# Patient Record
Sex: FEMALE | Race: WHITE | NOT HISPANIC OR LATINO | URBAN - METROPOLITAN AREA
[De-identification: names, ages, dates, MRNs, and addresses within clinical notes are randomized per-mention and may not be internally consistent; named-entity substitution may affect disease eponyms.]

---

## 2023-10-10 ENCOUNTER — EMERGENCY (EMERGENCY)
Facility: HOSPITAL | Age: 38
LOS: 1 days | Discharge: HOME | End: 2023-10-10
Attending: EMERGENCY MEDICINE | Admitting: EMERGENCY MEDICINE
Payer: COMMERCIAL

## 2023-10-10 VITALS
SYSTOLIC BLOOD PRESSURE: 139 MMHG | HEART RATE: 96 BPM | TEMPERATURE: 97 F | WEIGHT: 250 LBS | DIASTOLIC BLOOD PRESSURE: 96 MMHG | RESPIRATION RATE: 19 BRPM | OXYGEN SATURATION: 99 %

## 2023-10-10 VITALS
OXYGEN SATURATION: 97 % | SYSTOLIC BLOOD PRESSURE: 136 MMHG | RESPIRATION RATE: 17 BRPM | TEMPERATURE: 98 F | DIASTOLIC BLOOD PRESSURE: 87 MMHG | HEART RATE: 84 BPM

## 2023-10-10 DIAGNOSIS — Z87.39 PERSONAL HISTORY OF OTHER DISEASES OF THE MUSCULOSKELETAL SYSTEM AND CONNECTIVE TISSUE: Chronic | ICD-10-CM

## 2023-10-10 LAB
ALBUMIN SERPL ELPH-MCNC: 3.5 G/DL — SIGNIFICANT CHANGE UP (ref 3.3–5)
ALP SERPL-CCNC: 95 U/L — SIGNIFICANT CHANGE UP (ref 40–120)
ALT FLD-CCNC: 38 U/L — SIGNIFICANT CHANGE UP (ref 12–78)
ANION GAP SERPL CALC-SCNC: 6 MMOL/L — SIGNIFICANT CHANGE UP (ref 5–17)
APPEARANCE UR: CLEAR — SIGNIFICANT CHANGE UP
AST SERPL-CCNC: 33 U/L — SIGNIFICANT CHANGE UP (ref 15–37)
BACTERIA # UR AUTO: ABNORMAL /HPF
BASOPHILS # BLD AUTO: 0.06 K/UL — SIGNIFICANT CHANGE UP (ref 0–0.2)
BASOPHILS NFR BLD AUTO: 0.4 % — SIGNIFICANT CHANGE UP (ref 0–2)
BILIRUB SERPL-MCNC: 0.5 MG/DL — SIGNIFICANT CHANGE UP (ref 0.2–1.2)
BILIRUB UR-MCNC: NEGATIVE — SIGNIFICANT CHANGE UP
BUN SERPL-MCNC: 7 MG/DL — SIGNIFICANT CHANGE UP (ref 7–23)
CALCIUM SERPL-MCNC: 9.6 MG/DL — SIGNIFICANT CHANGE UP (ref 8.5–10.1)
CHLORIDE SERPL-SCNC: 109 MMOL/L — HIGH (ref 96–108)
CO2 SERPL-SCNC: 26 MMOL/L — SIGNIFICANT CHANGE UP (ref 22–31)
COLOR SPEC: YELLOW — SIGNIFICANT CHANGE UP
CREAT SERPL-MCNC: 0.47 MG/DL — LOW (ref 0.5–1.3)
DIFF PNL FLD: ABNORMAL
EGFR: 126 ML/MIN/1.73M2 — SIGNIFICANT CHANGE UP
EOSINOPHIL # BLD AUTO: 0.15 K/UL — SIGNIFICANT CHANGE UP (ref 0–0.5)
EOSINOPHIL NFR BLD AUTO: 1 % — SIGNIFICANT CHANGE UP (ref 0–6)
EPI CELLS # UR: PRESENT
GLUCOSE SERPL-MCNC: 101 MG/DL — HIGH (ref 70–99)
GLUCOSE UR QL: NEGATIVE MG/DL — SIGNIFICANT CHANGE UP
HCG UR QL: NEGATIVE — SIGNIFICANT CHANGE UP
HCT VFR BLD CALC: 41.1 % — SIGNIFICANT CHANGE UP (ref 34.5–45)
HGB BLD-MCNC: 12.9 G/DL — SIGNIFICANT CHANGE UP (ref 11.5–15.5)
IMM GRANULOCYTES NFR BLD AUTO: 0.5 % — SIGNIFICANT CHANGE UP (ref 0–0.9)
KETONES UR-MCNC: NEGATIVE MG/DL — SIGNIFICANT CHANGE UP
LEUKOCYTE ESTERASE UR-ACNC: ABNORMAL
LIDOCAIN IGE QN: 14 U/L — SIGNIFICANT CHANGE UP (ref 13–75)
LYMPHOCYTES # BLD AUTO: 15 % — SIGNIFICANT CHANGE UP (ref 13–44)
LYMPHOCYTES # BLD AUTO: 2.26 K/UL — SIGNIFICANT CHANGE UP (ref 1–3.3)
MCHC RBC-ENTMCNC: 28.2 PG — SIGNIFICANT CHANGE UP (ref 27–34)
MCHC RBC-ENTMCNC: 31.4 GM/DL — LOW (ref 32–36)
MCV RBC AUTO: 89.9 FL — SIGNIFICANT CHANGE UP (ref 80–100)
MONOCYTES # BLD AUTO: 0.66 K/UL — SIGNIFICANT CHANGE UP (ref 0–0.9)
MONOCYTES NFR BLD AUTO: 4.4 % — SIGNIFICANT CHANGE UP (ref 2–14)
NEUTROPHILS # BLD AUTO: 11.88 K/UL — HIGH (ref 1.8–7.4)
NEUTROPHILS NFR BLD AUTO: 78.7 % — HIGH (ref 43–77)
NITRITE UR-MCNC: NEGATIVE — SIGNIFICANT CHANGE UP
NRBC # BLD: 0 /100 WBCS — SIGNIFICANT CHANGE UP (ref 0–0)
PH UR: 7.5 — SIGNIFICANT CHANGE UP (ref 5–8)
PLATELET # BLD AUTO: 360 K/UL — SIGNIFICANT CHANGE UP (ref 150–400)
POTASSIUM SERPL-MCNC: 4.5 MMOL/L — SIGNIFICANT CHANGE UP (ref 3.5–5.3)
POTASSIUM SERPL-SCNC: 4.5 MMOL/L — SIGNIFICANT CHANGE UP (ref 3.5–5.3)
PROT SERPL-MCNC: 7.9 G/DL — SIGNIFICANT CHANGE UP (ref 6–8.3)
PROT UR-MCNC: NEGATIVE MG/DL — SIGNIFICANT CHANGE UP
RBC # BLD: 4.57 M/UL — SIGNIFICANT CHANGE UP (ref 3.8–5.2)
RBC # FLD: 12.5 % — SIGNIFICANT CHANGE UP (ref 10.3–14.5)
RBC CASTS # UR COMP ASSIST: 8 /HPF — HIGH (ref 0–4)
SODIUM SERPL-SCNC: 141 MMOL/L — SIGNIFICANT CHANGE UP (ref 135–145)
SP GR SPEC: 1.03 — HIGH (ref 1–1.03)
UROBILINOGEN FLD QL: 1 MG/DL — SIGNIFICANT CHANGE UP (ref 0.2–1)
WBC # BLD: 15.09 K/UL — HIGH (ref 3.8–10.5)
WBC # FLD AUTO: 15.09 K/UL — HIGH (ref 3.8–10.5)
WBC UR QL: 6 /HPF — HIGH (ref 0–5)

## 2023-10-10 PROCEDURE — G1004: CPT

## 2023-10-10 PROCEDURE — 81025 URINE PREGNANCY TEST: CPT

## 2023-10-10 PROCEDURE — 76856 US EXAM PELVIC COMPLETE: CPT

## 2023-10-10 PROCEDURE — 74177 CT ABD & PELVIS W/CONTRAST: CPT | Mod: ME

## 2023-10-10 PROCEDURE — 96374 THER/PROPH/DIAG INJ IV PUSH: CPT | Mod: XU

## 2023-10-10 PROCEDURE — 74177 CT ABD & PELVIS W/CONTRAST: CPT | Mod: 26,ME

## 2023-10-10 PROCEDURE — 99284 EMERGENCY DEPT VISIT MOD MDM: CPT | Mod: 25

## 2023-10-10 PROCEDURE — 36415 COLL VENOUS BLD VENIPUNCTURE: CPT

## 2023-10-10 PROCEDURE — 83690 ASSAY OF LIPASE: CPT

## 2023-10-10 PROCEDURE — 81001 URINALYSIS AUTO W/SCOPE: CPT

## 2023-10-10 PROCEDURE — 85025 COMPLETE CBC W/AUTO DIFF WBC: CPT

## 2023-10-10 PROCEDURE — 99285 EMERGENCY DEPT VISIT HI MDM: CPT

## 2023-10-10 PROCEDURE — 80053 COMPREHEN METABOLIC PANEL: CPT

## 2023-10-10 PROCEDURE — 76856 US EXAM PELVIC COMPLETE: CPT | Mod: 26

## 2023-10-10 RX ORDER — SODIUM CHLORIDE 9 MG/ML
1000 INJECTION INTRAMUSCULAR; INTRAVENOUS; SUBCUTANEOUS ONCE
Refills: 0 | Status: COMPLETED | OUTPATIENT
Start: 2023-10-10 | End: 2023-10-10

## 2023-10-10 RX ORDER — FAMOTIDINE 10 MG/ML
20 INJECTION INTRAVENOUS ONCE
Refills: 0 | Status: COMPLETED | OUTPATIENT
Start: 2023-10-10 | End: 2023-10-10

## 2023-10-10 RX ORDER — SUCRALFATE 1 G
1 TABLET ORAL ONCE
Refills: 0 | Status: COMPLETED | OUTPATIENT
Start: 2023-10-10 | End: 2023-10-10

## 2023-10-10 RX ADMIN — FAMOTIDINE 20 MILLIGRAM(S): 10 INJECTION INTRAVENOUS at 16:24

## 2023-10-10 RX ADMIN — Medication 1 GRAM(S): at 16:24

## 2023-10-10 RX ADMIN — SODIUM CHLORIDE 1000 MILLILITER(S): 9 INJECTION INTRAMUSCULAR; INTRAVENOUS; SUBCUTANEOUS at 16:25

## 2023-10-10 NOTE — ED PROVIDER NOTE - CONSTITUTIONAL, MLM
Well appearing, awake, alert, oriented to person, place, time/situation and in no apparent distress. no normal...

## 2023-10-10 NOTE — ED ADULT NURSE NOTE - OBJECTIVE STATEMENT
Pt. received alert and oriented x3 with chief complaint of upper bilateral abdominal pain since this afternoon. Pt. denies any other symptoms at current time.

## 2023-10-10 NOTE — ED PROVIDER NOTE - CARE PROVIDER_API CALL
John Devine  Gastroenterology  [address] [place] [address]  Phone: [phone]  Fax: [phone]  Follow Up Time:    Carolina Osman  Obstetrics and Gynecology  [address], [address]  [place] [address]  Phone: [phone]  Fax: [phone]  Follow Up Time:

## 2023-10-10 NOTE — ED PROVIDER NOTE - NSICDXPASTMEDICALHX_GEN_ALL_CORE_FT
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

## 2023-10-10 NOTE — ED PROVIDER NOTE - NSICDXFAMILYHX_GEN_ALL_CORE_FT
FAMILY HISTORY:  Father  Still living? Unknown  Family history of early CAD, Age at diagnosis: Age Unknown  Family history of hypertension, Age at diagnosis: Age Unknown    Mother  Still living? Unknown  Family history of early CAD, Age at diagnosis: Age Unknown  Family history of ovarian cancer, Age at diagnosis: Age Unknown  Family history of pancreatic cancer, Age at diagnosis: Age Unknown    Aunt  Still living? Unknown  Family history of diabetes mellitus (DM), Age at diagnosis: Age Unknown

## 2023-10-10 NOTE — ED PROVIDER NOTE - OBJECTIVE STATEMENT
39yo female with sudden onset of abd pain today, pt states he started to have pain today at 130pm, sudden in onset, diffuse, no nausea or vomiting no fever, chills, pt took peptobismol and gas x with no relief.

## 2023-10-10 NOTE — ED PROVIDER NOTE - PROGRESS NOTE DETAILS
patient states that her abdominal pain has improved 1/10, ct and US results discussed, patient states she has a history of ovarian cysts in 2016, non toxic appearing, agrees to f/u with her GYN, understands to return to er for worsnieng of symoptms, fever

## 2023-10-10 NOTE — ED PROVIDER NOTE - NSFOLLOWUPINSTRUCTIONS_ED_ALL_ED_FT
Follow up with your GYN this week, call to make an appointment. Take over the counter ibuprofen 600mg orally every 4 -6 hours as needed for pain. Return to ER for fever >100.3 F, severe pain and or worsneing of symptoms.

## 2023-10-10 NOTE — ED PROVIDER NOTE - PATIENT PORTAL LINK FT
You can access the FollowMyHealth Patient Portal offered by [facility] by registering at the following website: [email or web address]. By joining [email or web address]’s FollowMyHealth portal, you will also be able to view your health information using other applications (apps) compatible with our system.

## 2023-10-10 NOTE — ED PROVIDER NOTE - DISCHARGE DATE
to call for any fever or for prolonged or severe pain or bleeding. She was advised to use OTC analgesics as needed for mild to moderate pain. She was advised to avoid vaginal intercourse for 48 hours or until the bleeding has completely stopped. Attending Physician Documentation:  I was present for or participated in the entire procedure, including opening and closing.     -plans per results. Will need to decide what to do if cycles continue to be heavy and painful. 10-Oct-2023

## 2023-10-10 NOTE — ED ADULT NURSE NOTE - NSFALLHARMRISKINTERV_ED_ALL_ED

## 2023-11-02 ENCOUNTER — NON-APPOINTMENT (OUTPATIENT)
Age: 38
End: 2023-11-02

## 2023-11-03 ENCOUNTER — NON-APPOINTMENT (OUTPATIENT)
Age: 38
End: 2023-11-03

## 2023-11-06 ENCOUNTER — NON-APPOINTMENT (OUTPATIENT)
Age: 38
End: 2023-11-06

## 2023-11-07 ENCOUNTER — APPOINTMENT (OUTPATIENT)
Dept: GYNECOLOGIC ONCOLOGY | Facility: CLINIC | Age: 38
End: 2023-11-07
Payer: COMMERCIAL

## 2023-11-07 ENCOUNTER — NON-APPOINTMENT (OUTPATIENT)
Age: 38
End: 2023-11-07

## 2023-11-07 VITALS
HEIGHT: 63 IN | DIASTOLIC BLOOD PRESSURE: 104 MMHG | BODY MASS INDEX: 39.87 KG/M2 | WEIGHT: 225 LBS | HEART RATE: 97 BPM | SYSTOLIC BLOOD PRESSURE: 162 MMHG

## 2023-11-07 DIAGNOSIS — Z80.1 FAMILY HISTORY OF MALIGNANT NEOPLASM OF TRACHEA, BRONCHUS AND LUNG: ICD-10-CM

## 2023-11-07 DIAGNOSIS — Z80.41 FAMILY HISTORY OF MALIGNANT NEOPLASM OF OVARY: ICD-10-CM

## 2023-11-07 DIAGNOSIS — Z80.0 FAMILY HISTORY OF MALIGNANT NEOPLASM OF DIGESTIVE ORGANS: ICD-10-CM

## 2023-11-07 LAB
APTT BLD: 32.3 SEC
INR PPP: 0.94 RATIO
PT BLD: 10.7 SEC

## 2023-11-07 PROCEDURE — 99205 OFFICE O/P NEW HI 60 MIN: CPT

## 2023-11-07 RX ORDER — CETIRIZINE HCL 10 MG
TABLET ORAL
Refills: 0 | Status: ACTIVE | COMMUNITY

## 2023-11-08 LAB
ALBUMIN SERPL ELPH-MCNC: 4.6 G/DL
ALP BLD-CCNC: 92 U/L
ALT SERPL-CCNC: 28 U/L
ANION GAP SERPL CALC-SCNC: 15 MMOL/L
AST SERPL-CCNC: 24 U/L
BASOPHILS # BLD AUTO: 0.05 K/UL
BASOPHILS NFR BLD AUTO: 0.5 %
BILIRUB SERPL-MCNC: 0.4 MG/DL
BUN SERPL-MCNC: 6 MG/DL
CALCIUM SERPL-MCNC: 9.7 MG/DL
CANCER AG125 SERPL-ACNC: 139 U/ML
CANCER AG19-9 SERPL-ACNC: 45 U/ML
CEA SERPL-MCNC: 1.2 NG/ML
CHLORIDE SERPL-SCNC: 99 MMOL/L
CO2 SERPL-SCNC: 23 MMOL/L
CREAT SERPL-MCNC: 0.3 MG/DL
EGFR: 140 ML/MIN/1.73M2
EOSINOPHIL # BLD AUTO: 0.18 K/UL
EOSINOPHIL NFR BLD AUTO: 1.9 %
ESTIMATED AVERAGE GLUCOSE: 97 MG/DL
GLUCOSE SERPL-MCNC: 99 MG/DL
HBA1C MFR BLD HPLC: 5 %
HCT VFR BLD CALC: 41.9 %
HGB BLD-MCNC: 12.7 G/DL
IMM GRANULOCYTES NFR BLD AUTO: 0.4 %
LYMPHOCYTES # BLD AUTO: 2.96 K/UL
LYMPHOCYTES NFR BLD AUTO: 30.8 %
MAN DIFF?: NORMAL
MCHC RBC-ENTMCNC: 27.9 PG
MCHC RBC-ENTMCNC: 30.3 GM/DL
MCV RBC AUTO: 92.1 FL
MONOCYTES # BLD AUTO: 0.59 K/UL
MONOCYTES NFR BLD AUTO: 6.1 %
NEUTROPHILS # BLD AUTO: 5.8 K/UL
NEUTROPHILS NFR BLD AUTO: 60.3 %
PLATELET # BLD AUTO: 350 K/UL
POTASSIUM SERPL-SCNC: 4.7 MMOL/L
PROT SERPL-MCNC: 7.4 G/DL
RBC # BLD: 4.55 M/UL
RBC # FLD: 13.1 %
SODIUM SERPL-SCNC: 137 MMOL/L
WBC # FLD AUTO: 9.62 K/UL

## 2023-11-09 ENCOUNTER — APPOINTMENT (OUTPATIENT)
Dept: SURGERY | Facility: CLINIC | Age: 38
End: 2023-11-09
Payer: COMMERCIAL

## 2023-11-09 VITALS
OXYGEN SATURATION: 100 % | WEIGHT: 225 LBS | HEIGHT: 63 IN | RESPIRATION RATE: 15 BRPM | BODY MASS INDEX: 39.87 KG/M2 | SYSTOLIC BLOOD PRESSURE: 142 MMHG | HEART RATE: 92 BPM | DIASTOLIC BLOOD PRESSURE: 84 MMHG

## 2023-11-09 DIAGNOSIS — Z83.79 FAMILY HISTORY OF OTHER DISEASES OF THE DIGESTIVE SYSTEM: ICD-10-CM

## 2023-11-09 PROCEDURE — 99202 OFFICE O/P NEW SF 15 MIN: CPT

## 2023-11-09 RX ORDER — METRONIDAZOLE 250 MG/1
250 TABLET ORAL
Qty: 3 | Refills: 0 | Status: ACTIVE | COMMUNITY
Start: 2023-11-09 | End: 1900-01-01

## 2023-11-09 RX ORDER — NEOMYCIN SULFATE 500 MG/1
500 TABLET ORAL
Qty: 3 | Refills: 0 | Status: ACTIVE | COMMUNITY
Start: 2023-11-09 | End: 1900-01-01

## 2023-11-13 ENCOUNTER — APPOINTMENT (OUTPATIENT)
Dept: GASTROENTEROLOGY | Facility: CLINIC | Age: 38
End: 2023-11-13
Payer: COMMERCIAL

## 2023-11-13 VITALS
HEART RATE: 94 BPM | BODY MASS INDEX: 39.87 KG/M2 | OXYGEN SATURATION: 97 % | SYSTOLIC BLOOD PRESSURE: 137 MMHG | WEIGHT: 225 LBS | RESPIRATION RATE: 20 BRPM | DIASTOLIC BLOOD PRESSURE: 84 MMHG | HEIGHT: 63 IN

## 2023-11-13 DIAGNOSIS — G71.00 MUSCULAR DYSTROPHY, UNSPECIFIED: ICD-10-CM

## 2023-11-13 DIAGNOSIS — K59.09 OTHER CONSTIPATION: ICD-10-CM

## 2023-11-13 DIAGNOSIS — Z87.39 PERSONAL HISTORY OF OTHER DISEASES OF THE MUSCULOSKELETAL SYSTEM AND CONNECTIVE TISSUE: ICD-10-CM

## 2023-11-13 DIAGNOSIS — R10.9 UNSPECIFIED ABDOMINAL PAIN: ICD-10-CM

## 2023-11-13 DIAGNOSIS — K62.5 HEMORRHAGE OF ANUS AND RECTUM: ICD-10-CM

## 2023-11-13 DIAGNOSIS — K57.30 DIVERTICULOSIS OF LARGE INTESTINE W/OUT PERFORATION OR ABSCESS W/OUT BLEEDING: ICD-10-CM

## 2023-11-13 DIAGNOSIS — K64.9 UNSPECIFIED HEMORRHOIDS: ICD-10-CM

## 2023-11-13 PROCEDURE — 99204 OFFICE O/P NEW MOD 45 MIN: CPT

## 2023-11-14 ENCOUNTER — APPOINTMENT (OUTPATIENT)
Dept: MRI IMAGING | Facility: CLINIC | Age: 38
End: 2023-11-14
Payer: COMMERCIAL

## 2023-11-14 PROCEDURE — 72197 MRI PELVIS W/O & W/DYE: CPT

## 2023-11-14 PROCEDURE — 74183 MRI ABD W/O CNTR FLWD CNTR: CPT

## 2023-11-17 ENCOUNTER — NON-APPOINTMENT (OUTPATIENT)
Age: 38
End: 2023-11-17

## 2023-11-27 PROBLEM — N80.9 ENDOMETRIOSIS: Status: ACTIVE | Noted: 2023-11-27

## 2023-11-27 PROBLEM — R19.00 PELVIC MASS IN FEMALE: Status: ACTIVE | Noted: 2023-11-05

## 2023-11-28 ENCOUNTER — APPOINTMENT (OUTPATIENT)
Dept: GYNECOLOGIC ONCOLOGY | Facility: CLINIC | Age: 38
End: 2023-11-28
Payer: COMMERCIAL

## 2023-11-28 VITALS
HEIGHT: 63 IN | WEIGHT: 225 LBS | BODY MASS INDEX: 39.87 KG/M2 | HEART RATE: 80 BPM | SYSTOLIC BLOOD PRESSURE: 152 MMHG | DIASTOLIC BLOOD PRESSURE: 93 MMHG

## 2023-11-28 DIAGNOSIS — N80.9 ENDOMETRIOSIS, UNSPECIFIED: ICD-10-CM

## 2023-11-28 DIAGNOSIS — R19.00 INTRA-ABDOMINAL AND PELVIC SWELLING, MASS AND LUMP, UNSPECIFIED SITE: ICD-10-CM

## 2023-11-28 PROCEDURE — 99215 OFFICE O/P EST HI 40 MIN: CPT

## 2023-12-21 ENCOUNTER — OUTPATIENT (OUTPATIENT)
Dept: OUTPATIENT SERVICES | Facility: HOSPITAL | Age: 38
LOS: 1 days | End: 2023-12-21

## 2023-12-21 VITALS
SYSTOLIC BLOOD PRESSURE: 148 MMHG | WEIGHT: 242.07 LBS | TEMPERATURE: 98 F | HEIGHT: 62.5 IN | DIASTOLIC BLOOD PRESSURE: 90 MMHG | RESPIRATION RATE: 16 BRPM | OXYGEN SATURATION: 97 % | HEART RATE: 87 BPM

## 2023-12-21 DIAGNOSIS — Z90.49 ACQUIRED ABSENCE OF OTHER SPECIFIED PARTS OF DIGESTIVE TRACT: Chronic | ICD-10-CM

## 2023-12-21 DIAGNOSIS — R19.00 INTRA-ABDOMINAL AND PELVIC SWELLING, MASS AND LUMP, UNSPECIFIED SITE: ICD-10-CM

## 2023-12-21 DIAGNOSIS — Z87.39 PERSONAL HISTORY OF OTHER DISEASES OF THE MUSCULOSKELETAL SYSTEM AND CONNECTIVE TISSUE: Chronic | ICD-10-CM

## 2023-12-21 LAB
A1C WITH ESTIMATED AVERAGE GLUCOSE RESULT: 4.9 % — SIGNIFICANT CHANGE UP (ref 4–5.6)
A1C WITH ESTIMATED AVERAGE GLUCOSE RESULT: 4.9 % — SIGNIFICANT CHANGE UP (ref 4–5.6)
ANION GAP SERPL CALC-SCNC: 15 MMOL/L — HIGH (ref 7–14)
ANION GAP SERPL CALC-SCNC: 15 MMOL/L — HIGH (ref 7–14)
BLD GP AB SCN SERPL QL: NEGATIVE — SIGNIFICANT CHANGE UP
BLD GP AB SCN SERPL QL: NEGATIVE — SIGNIFICANT CHANGE UP
BUN SERPL-MCNC: 6 MG/DL — LOW (ref 7–23)
BUN SERPL-MCNC: 6 MG/DL — LOW (ref 7–23)
CALCIUM SERPL-MCNC: 9.8 MG/DL — SIGNIFICANT CHANGE UP (ref 8.4–10.5)
CALCIUM SERPL-MCNC: 9.8 MG/DL — SIGNIFICANT CHANGE UP (ref 8.4–10.5)
CHLORIDE SERPL-SCNC: 102 MMOL/L — SIGNIFICANT CHANGE UP (ref 98–107)
CHLORIDE SERPL-SCNC: 102 MMOL/L — SIGNIFICANT CHANGE UP (ref 98–107)
CO2 SERPL-SCNC: 21 MMOL/L — LOW (ref 22–31)
CO2 SERPL-SCNC: 21 MMOL/L — LOW (ref 22–31)
CREAT SERPL-MCNC: 0.28 MG/DL — LOW (ref 0.5–1.3)
CREAT SERPL-MCNC: 0.28 MG/DL — LOW (ref 0.5–1.3)
EGFR: 142 ML/MIN/1.73M2 — SIGNIFICANT CHANGE UP
EGFR: 142 ML/MIN/1.73M2 — SIGNIFICANT CHANGE UP
ESTIMATED AVERAGE GLUCOSE: 94 — SIGNIFICANT CHANGE UP
ESTIMATED AVERAGE GLUCOSE: 94 — SIGNIFICANT CHANGE UP
GLUCOSE SERPL-MCNC: 85 MG/DL — SIGNIFICANT CHANGE UP (ref 70–99)
GLUCOSE SERPL-MCNC: 85 MG/DL — SIGNIFICANT CHANGE UP (ref 70–99)
HCG SERPL-ACNC: <1 MIU/ML — SIGNIFICANT CHANGE UP
HCG SERPL-ACNC: <1 MIU/ML — SIGNIFICANT CHANGE UP
HCT VFR BLD CALC: 39.7 % — SIGNIFICANT CHANGE UP (ref 34.5–45)
HCT VFR BLD CALC: 39.7 % — SIGNIFICANT CHANGE UP (ref 34.5–45)
HGB BLD-MCNC: 12.5 G/DL — SIGNIFICANT CHANGE UP (ref 11.5–15.5)
HGB BLD-MCNC: 12.5 G/DL — SIGNIFICANT CHANGE UP (ref 11.5–15.5)
MCHC RBC-ENTMCNC: 27.7 PG — SIGNIFICANT CHANGE UP (ref 27–34)
MCHC RBC-ENTMCNC: 27.7 PG — SIGNIFICANT CHANGE UP (ref 27–34)
MCHC RBC-ENTMCNC: 31.5 GM/DL — LOW (ref 32–36)
MCHC RBC-ENTMCNC: 31.5 GM/DL — LOW (ref 32–36)
MCV RBC AUTO: 88 FL — SIGNIFICANT CHANGE UP (ref 80–100)
MCV RBC AUTO: 88 FL — SIGNIFICANT CHANGE UP (ref 80–100)
NRBC # BLD: 0 /100 WBCS — SIGNIFICANT CHANGE UP (ref 0–0)
NRBC # BLD: 0 /100 WBCS — SIGNIFICANT CHANGE UP (ref 0–0)
NRBC # FLD: 0 K/UL — SIGNIFICANT CHANGE UP (ref 0–0)
NRBC # FLD: 0 K/UL — SIGNIFICANT CHANGE UP (ref 0–0)
PLATELET # BLD AUTO: 398 K/UL — SIGNIFICANT CHANGE UP (ref 150–400)
PLATELET # BLD AUTO: 398 K/UL — SIGNIFICANT CHANGE UP (ref 150–400)
POTASSIUM SERPL-MCNC: 3.9 MMOL/L — SIGNIFICANT CHANGE UP (ref 3.5–5.3)
POTASSIUM SERPL-MCNC: 3.9 MMOL/L — SIGNIFICANT CHANGE UP (ref 3.5–5.3)
POTASSIUM SERPL-SCNC: 3.9 MMOL/L — SIGNIFICANT CHANGE UP (ref 3.5–5.3)
POTASSIUM SERPL-SCNC: 3.9 MMOL/L — SIGNIFICANT CHANGE UP (ref 3.5–5.3)
RBC # BLD: 4.51 M/UL — SIGNIFICANT CHANGE UP (ref 3.8–5.2)
RBC # BLD: 4.51 M/UL — SIGNIFICANT CHANGE UP (ref 3.8–5.2)
RBC # FLD: 12.7 % — SIGNIFICANT CHANGE UP (ref 10.3–14.5)
RBC # FLD: 12.7 % — SIGNIFICANT CHANGE UP (ref 10.3–14.5)
RH IG SCN BLD-IMP: POSITIVE — SIGNIFICANT CHANGE UP
RH IG SCN BLD-IMP: POSITIVE — SIGNIFICANT CHANGE UP
SODIUM SERPL-SCNC: 138 MMOL/L — SIGNIFICANT CHANGE UP (ref 135–145)
SODIUM SERPL-SCNC: 138 MMOL/L — SIGNIFICANT CHANGE UP (ref 135–145)
WBC # BLD: 11.22 K/UL — HIGH (ref 3.8–10.5)
WBC # BLD: 11.22 K/UL — HIGH (ref 3.8–10.5)
WBC # FLD AUTO: 11.22 K/UL — HIGH (ref 3.8–10.5)
WBC # FLD AUTO: 11.22 K/UL — HIGH (ref 3.8–10.5)

## 2023-12-21 RX ORDER — SODIUM CHLORIDE 9 MG/ML
3 INJECTION INTRAMUSCULAR; INTRAVENOUS; SUBCUTANEOUS EVERY 8 HOURS
Refills: 0 | Status: DISCONTINUED | OUTPATIENT
Start: 2023-12-28 | End: 2023-12-29

## 2023-12-21 RX ORDER — SODIUM CHLORIDE 9 MG/ML
1000 INJECTION, SOLUTION INTRAVENOUS
Refills: 0 | Status: DISCONTINUED | OUTPATIENT
Start: 2023-12-28 | End: 2023-12-29

## 2023-12-21 RX ORDER — CHLORHEXIDINE GLUCONATE 213 G/1000ML
1 SOLUTION TOPICAL ONCE
Refills: 0 | Status: COMPLETED | OUTPATIENT
Start: 2023-12-28 | End: 2023-12-28

## 2023-12-21 NOTE — H&P PST ADULT - NEGATIVE GENERAL GENITOURINARY SYMPTOMS
no hematuria/no flank pain L/no flank pain R/no urine discoloration/no incontinence/no dysuria/normal urinary frequency/no nocturia

## 2023-12-21 NOTE — H&P PST ADULT - HISTORY OF PRESENT ILLNESS
37 y/o female with hereditary inclusion body myopathies (HIBM) presents to [facility] preop for robotic assisted total laparoscopic hysterectomy with bilateral salpingo oophorectomy possible open procedure. Pt presented to [facility] in October reporting abdominal pain. Workup imagining revealed a pelvic mass.      37 y/o female with hereditary inclusion body myopathies (HIBM) presents to [facility] preop for robotic assisted total laparoscopic hysterectomy with bilateral salpingo oophorectomy possible open procedure. Pt presented to [facility] in October reporting abdominal pain. Workup imagining revealed a pelvic mass.

## 2023-12-21 NOTE — H&P PST ADULT - OTHER CARE PROVIDERS
Neuromuscular Specialist Dr. Moreno Duarte [phone] Neuromuscular Specialist Dr. Moreno Duarte [phone]

## 2023-12-21 NOTE — H&P PST ADULT - NSICDXFAMILYHX_GEN_ALL_CORE_FT
FAMILY HISTORY:  Father  Still living? Unknown  Family history of early CAD, Age at diagnosis: Age Unknown  Family history of hypertension, Age at diagnosis: Age Unknown    Mother  Still living? Unknown  Family history of early CAD, Age at diagnosis: Age Unknown  Family history of ovarian cancer, Age at diagnosis: Age Unknown  Family history of pancreatic cancer, Age at diagnosis: Age Unknown    Aunt  Still living? Unknown  Family history of diabetes mellitus (DM), Age at diagnosis: Age Unknown  FH: ovarian cancer, Age at diagnosis: Age Unknown

## 2023-12-21 NOTE — H&P PST ADULT - ASSESSMENT
37 y/o female with hereditary inclusion body myopathies (HIBM) presents to [facility] preop for robotic assisted total laparoscopic hysterectomy with bilateral salpingo oophorectomy possible open procedure. Pt presented to [facility] in October reporting abdominal pain. Workup imagining revealed a pelvic mass.  39 y/o female with hereditary inclusion body myopathies (HIBM) presents to [facility] preop for robotic assisted total laparoscopic hysterectomy with bilateral salpingo oophorectomy possible open procedure. Pt presented to [facility] in October reporting abdominal pain. Workup imagining revealed a pelvic mass.

## 2023-12-21 NOTE — H&P PST ADULT - NEGATIVE NEUROLOGICAL SYMPTOMS
no paresthesias/no generalized seizures/no syncope/no tremors/no vertigo/no loss of sensation/no headache/no loss of consciousness/no hemiparesis/no confusion/no facial palsy

## 2023-12-21 NOTE — H&P PST ADULT - RESPIRATORY
clear to auscultation bilaterally/no wheezes/breath sounds equal clear to auscultation bilaterally/no wheezes/breath sounds equal/respirations non-labored

## 2023-12-21 NOTE — H&P PST ADULT - NECK
FROM/supple/symmetric/no tracheal deviation FROM/supple/symmetric/no tracheal deviation/no palpable masses

## 2023-12-21 NOTE — H&P PST ADULT - NSICDXPASTMEDICALHX_GEN_ALL_CORE_FT
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

## 2023-12-21 NOTE — H&P PST ADULT - CARDIOVASCULAR
regular rate and rhythm/S1 S2 present negative regular rate and rhythm/S1 S2 present/no murmur/no JVD/peripheral edema/vascular

## 2023-12-21 NOTE — H&P PST ADULT - ATTENDING COMMENTS
Patient seen in ASU with her mother, no changes reported. Consent form reviewed including examination by learner, total hysterectomy, bilateral salpingoophorectomy, possible; open procedure, staging, debulking, bowel surgery. All questions answered. Case reviewed with circulating OR team.     Rosaura Myers MD

## 2023-12-21 NOTE — H&P PST ADULT - PROBLEM SELECTOR PLAN 1
preop for robotic assisted total laparoscopic hysterectomy with bilateral salpingo oophorectomy possible open procedure on 12/28/23  preop instructions given, pt verbalized understanding  GI prophylaxis and chlorhexidine wash provided  cbc, bmp, hga1c, type, abo, hcg pending preop for robotic assisted total laparoscopic hysterectomy with bilateral salpingo oophorectomy possible open procedure on 12/28/23  preop instructions given, pt verbalized understanding  GI prophylaxis and chlorhexidine wash provided  cbc, bmp, hga1c, type, hcg pending  unable to obtain ABO at PST. will order confirmatory ABO for DOS.    Medical evaluation requested- pt's METS <4.

## 2023-12-21 NOTE — H&P PST ADULT - FUNCTIONAL STATUS
Activity: walks short distances with assistance of walker in home, uses wheelchair outside the home, can shower and dress self independently   Symptoms: None  Mets: 3.30 using [employer] calculator

## 2023-12-27 ENCOUNTER — TRANSCRIPTION ENCOUNTER (OUTPATIENT)
Age: 38
End: 2023-12-27

## 2023-12-27 NOTE — ASU PATIENT PROFILE, ADULT - FALL HARM RISK - UNIVERSAL INTERVENTIONS
Bed in lowest position, wheels locked, appropriate side rails in place/Call bell, personal items and telephone in reach/Instruct patient to call for assistance before getting out of bed or chair/Non-slip footwear when patient is out of bed/[place] to call system/Physically safe environment - no spills, clutter or unnecessary equipment/Purposeful Proactive Rounding/Room/bathroom lighting operational, light cord in reach Bed in lowest position, wheels locked, appropriate side rails in place/Call bell, personal items and telephone in reach/Instruct patient to call for assistance before getting out of bed or chair/Non-slip footwear when patient is out of bed/[place] to call system/Physically safe environment - no spills, clutter or unnecessary equipment/Purposeful Proactive Rounding/Room/bathroom lighting operational, light cord in reach

## 2023-12-27 NOTE — ASU PATIENT PROFILE, ADULT - ABILITY TO HEAR (WITH HEARING AID OR HEARING APPLIANCE IF NORMALLY USED):
Initial Anesthesia Post-op Note    Patient: Laurita Rock  Procedure(s) Performed:  SECTION  Anesthesia type: General    Vitals Value Taken Time   Temp 364 10/01/21 1602   Pulse 113 10/01/21 1602   Resp 24 10/01/21 1602   SpO2 100 10/01/21 1602   /73 10/01/21 1602         Patient Location: PACU Phase 1  Post-op Vital Signs:stable  Level of Consciousness: awake and alert  Respiratory Status: spontaneous ventilation  Cardiovascular stable  Hydration: euvolemic  Pain Management: adequately controlled  Handoff: Handoff to receiving nurse was performed and questions were answered  Vomiting: none  Nausea: None  Airway Patency:patent  Post-op Assessment: no complications, patient tolerated procedure well with no complications, no evidence of recall and dentition within defined limits      No complications documented.  
Adequate: hears normal conversation without difficulty

## 2023-12-27 NOTE — REVIEW OF SYSTEMS
[Negative] : Musculoskeletal [FreeTextEntry4] : occassional pelvic discomfort [FreeTextEntry2] : baseline neurologic dysfunction with LE weakness  [de-identified] : known baseline LE edema

## 2023-12-27 NOTE — ASSESSMENT
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

## 2023-12-27 NOTE — PHYSICAL EXAM
[Chaperone Present] : A chaperone was present in the examining room during all aspects of the physical examination [Normal] : No focal neurologic defects observed [Ambulatory and capable of all self care but unable to carry out any work activities] : Status 2- Ambulatory and capable of all self care but unable to carry out any work activities. Up and about more than 50% of waking hours [FreeTextEntry1] : [place]  [de-identified] : soft, obese, nontender

## 2023-12-27 NOTE — DISCUSSION/SUMMARY
[FreeTextEntry1] : - Patients history and work up to date reviewed in detail. - Supportive counseling provided given the patients ongoing medical issues and her family history of ovarian cancer. - We reviewed her consultaiton with Dr. Neely and her imaging results since her last visit in detail.  -Her genetic testing results returned (ordered by her prior GYN) revelaing BRIP1 mutation. This was extensively reviewed with the patient and her parents today. Her father noted this was the same mutation as his sister and niece who had ovarian cancer diagnosed in their 40's. We reviewed that with a BRIP1 pathogenic mutation, the absolute risk of ovarian cancer is reported to be between 5%-15% and NCCN guidelines recommend RRSO starting at age 45-50 years old. We reviewed the risks/benefits early menopause and future inability to have a biologic child with RRSO at the time of her currently procedure versus risk of malignancy given her family history, genetic predisposition and endometriosis. After extensive discussion, the patient desires to proceed with definitive surgery and risk reduction of cancer with hysterectomy and BSO. We discussed the risks/benefits of HRT after her surgery and she will think about it further but is inclined to have HRT.  - Therefore, after careful consideration and consultation the patient desires to undergo definitive surgical management with hysterectomy and BSO to be attempted via a minimally invasive approach if feasible. Dr. Neely will be available for backup given her complex surgical history (prior history of a ruptured appendix). - We discussed risks and benefits of surgery as well as the typical post operative course.  We also discussed potential alternatives to surgery. Ample time was allowed for questions to be asked and solicited.  All were answered and the patient expressed understanding.    We discussed the risks of surgery which include, but are not limited to:  -Bleeding that may require a blood transfusion  -Infection of the surgical incision sites, lungs, urine, kidneys or IV site that may can compromise healing and require IV antibiotics  -Injury to surrounding structures including the bladder, bowel, ureters, urethra, blood vessels, or nerves that may result in a loss of function or sensation.  -Blood clots in the extremities or lungs, which may lead to long term anticoagulation and difficulty breathing -The need for more surgery  After our discussion, all questions were again answered. She is aware of the risks and benefits and would like to proceed.  Patient will be seen by our anesthesia colleagues in pre-admission testing and have preoperative labs drawn.  -She will obtain perioperative optimization recommendations and medical clearances prior to the procedure.  - To have bowel prep for her surgery and patient aware. - I spent the time noted on the day of this patient encounter preparing for, providing and documenting the above service. I have counseled and educated the patient on the differential, workup, disease course, and treatment/management plan. Education was provided to the patient during this encounter. All questions and concerns were answered and addressed in detail.

## 2023-12-27 NOTE — HISTORY OF PRESENT ILLNESS
[FreeTextEntry1] : ED/ref: [facility] GYN: Dr. Clark  PCP:  Dr. Keith Leventhal Neuromuscular specialist ([facility]) - Dr. Moreno Duarte  Pt presents to today's visit with her mother.   Pt ambulates with a walker, presents today in a wheelchair Pt has b/l LE compression devices for b/l LE lymphadema.    Ms. Brar, 38 years old, virginal, referred through [facility] after presenting to [facility] with abdominal pain.  Work-up included imaging identifying a pelvic mass.  Of note, patient's medical history is significant for hereditary Inclusion Body Myopathies (HIBM). She ambulates with the assistance of a walker.   Denies f/c/n/v/d/changes to bowel or bladder habits.  Denies unintentional weight loss or gain.  Pt states she does have a history of constipation and 3 days after her visit to [place] ED, she had a bowel movement which resolved her abdominal pain.    Denies any other associated symptoms.    Interval history since [facility] visit of 2023: Consulted with colorectal, Dr. Neely with the plan for colorectal standby for surgery. Consulted with GI for bowel regimen perioperatively and postoperatively. Her genetic testing results returned (ordered by her prior GYN) revelaing BRIP1 mutation. This was extensively reviewed with the patient and her parents today. Her father noted this was the same mutation as his sister and niece who had ovarian cancer diagnosed in their 40's. We reviewed that with a BRIP1 pathogenic mutation, the absolute risk of ovarian cancer is reported to be between 5%-15% and NCCN guidelines recommend RRSO starting at age 45-50 years old. We reviewed the risks/benefits early menopause and future inability to have a biologic child with RRSO at the time of her currently procedure versus risk of malignancy given her family history, genetic predisposition and endometriosis. After extensive discussion, the patient desires to proceed with definitive surgery and risk reduction of cancer with hysterectomy and BSO. We discussed the risks/benefits of HRT after her surgery and she will think about it further but is inclined to have HRT.   Imaging: 10/10/2023 TVUS ([facility]) Uterus: 11.0 cm x 7.8 cm x 9.9 cm. . The uterine parenchyma is heterogeneous. Multiple fibroids are identified. Within the right posterior uterus there is a 4.6 x 4.2 x 3.4 cm intramural fibroid. A posterior subserosal fibroid measures 3.2 x 2.4 x 2.2 cm. A left intramural fibroid measures 4.1 x 3.2 x 2.6 cm. Endometrium: The endometrial echo complex cannot be visualized. Right ovary:There is a dilated fluid-filled tubular structure adjacent to the right ovary, suspicious for hydrosalpinx. The ovary measures 7.7 cm x 5.0 cm x 6.0 cm. . This measurement includes a 4.4 x 4.1 x 4.5 cm cystic structure with question of internal echoes, which is either a partially exophytic ovarian complex, or may represent portion of the dilated tube. Left ovary: A normal left ovary is not visualized. There is a large cystic structure of the left adnexa with associated septations. This measures 11.8 x 7.5 x 0.4 cm. Fluid: None visualized, though limited in evaluation secondary bowel gas. IMPRESSION: Fibroid uterus. Endometrium not visualized.  Question complex exophytic complex cyst of the right ovary. This may represent a hemorrhagic cyst or endometrioma. Alternatively, this may represent part of the adjacent fluid-filled dilated tubular structure of the right adnexa, suspicious for hydrosalpinx.  No normal left ovarian tissue identified. Complex cystic structure of the left adnexa with associated septations. This is increased in size compared to previous exam. Both benign and neoplastic etiologies are considerations. Advise further correlation with pre and postcontrast pelvic MRI for further delineation. [id number] MRI abdomen and pelvis ([facility]) LOWER CHEST: Within normal limits. LIVER: Within normal limits. BILE DUCTS: Normal caliber. GALLBLADDER: Within normal limits. SPLEEN: A few subcentimeter cysts. PANCREAS: Within normal limits. ADRENALS: Within normal limits. KIDNEYS/URETERS: No hydronephrosis. BLADDER: Within normal limits. REPRODUCTIVE ORGANS: UTERUS: 13.4 x 8.1 x 11.5 cm. Small posterior uterine fibroids as well as marked heterogeneity of the posterior myometrium with cystic changes and T1 bright foci concerning for invasive endometriosis. ENDOMETRIUM: 4 mm JUNCTIONAL ZONE: Markedly thickened posteriorly as above. RIGHT OVARY: 3.7 x 2.9 x 3.0 cm. LEFT OVARY: A 14.2 x 7.9 x 8.1 cm multi cystic/multi septated left adnexal lesion with peripheral ovarian tissue. Cysts contain hemorrhagic content atelectasis is most consistent with an endometrioma. This demonstrates increase in size from prior imaging dating to 2016. No evidence of internal enhancement. ADNEXA: A 10.5 x 4.9 x 4.9 cm tubular right adnexal structure adjacent to the right ovary containing hemorrhagic contents consistent with hematosalpinx. No evidence of internal enhancement. BOWEL: No bowel obstruction. Colonic diverticulosis. PERITONEUM: No ascites. VESSELS: Within normal limits. RETROPERITONEUM/LYMPH NODES: No lymphadenopathy. ABDOMINAL WALL: Within normal limits. BONES: Within normal limits. IMPRESSION: A 14.2 cm left ovarian endometrioma with interval growth from prior imaging dated to 2016. Marked right hematosalpinx. Concern for invasive endometriosis at the posterior uterus.  10/10/2023 CT A/P ([facility]) LOWER CHEST: Lung bases are clear. LIVER: Fatty infiltration. BILE DUCTS: Normal caliber. GALLBLADDER: Within normal limits. SPLEEN: Within normal limits. PANCREAS: Atrophic. ADRENALS: Within normal limits. KIDNEYS/URETERS: No hydronephrosis. Single or 2 adjacent calculi in the lower pole the right kidney measuring 8 mm. BLADDER: Normal caliber wall. Displaced anteriorly by the uterus. REPRODUCTIVE ORGANS: Multiseptated cystic lesions in the right and left adnexa measuring 11.1 x 7.2 x 10.4 cm on the left (series 602 image 62) and 9.6 x 4.7 x 5.2 cm on the right (series 602 image 57), previously 7.3 x 5.5 x 8.7 cm and 5.5 x 4.9 x 4.4 cm when remeasured with similar technique; scattered high attenuation foci again seen in the left which are incompletely characterized on this single phase study. Ovoid tubular appearing component at the superior aspect of the right adnexal cyst; hydrosalpinx cannot be excluded. Enlarged uterus measuring 12.7 x 7.5 x 11.9 cm with a suggestion of fibroids in the posterior uterine body. BOWEL: Colonic diverticulosis without evidence of diverticulitis. No bowel obstruction. Status post appendectomy. PERITONEUM: Small amount of free fluid in the pelvis. Mild infiltration of the fat in the cul-de-sac, nonspecific and may be related to fluid. VESSELS: Abdominal aorta normal in caliber. RETROPERITONEUM/LYMPH NODES: No lymphadenopathy. ABDOMINAL WALL: Unremarkable. BONES: Fatty atrophy of the musculature, greatest at the anterior aspect of the pelvis and in the proximal thighs. IMPRESSION: Bilateral multiseptated cystic lesions in the adnexa measuring 11.1 cm on the left and 9.6 cm on the right, increased in size since 2016; a pre and post IV contrast MRI of the pelvis is recommended for further evaluation; ovoid tubular appearing component of the right adnexal cyst; hydrosalpinx cannot be excluded. Enlarged uterus with a suggestion of uterine fibroids; continued attention is recommended on the MRI. Small amount of free fluid in the pelvis with mild nonspecific infiltration of the fat in the cul-de-sac, possibly related to the fluid.  Labs: 2023: Hemoglobin A1c = 5.0; CEA = 1.2; CA 19-9 = 45;  = 139 ([facility]) 10/23/23:  HbA1c = 5.1;  CEA = 1.3;   = 16;   = 182 ([facility])  POB:  G0, P0 - virgin PGYN:  Menarche age 11; LMP 10/30/23 PMH: GNE myopathy (genetic) diagnosed in 2016 (form of Muscular Dystrophy) Meds: Zyrtec Allergies: environmental;   lactose intolerant PSH:  muscle biopsy;  2016 lap appy (appendix had perforated and patient had extended stay in hospital on IV abx prior to lap [place]) Social Hx: Non-smoker, no alcohol, no drugs FH: mother - ovarian cancer - age 39;  maternal grandmother - pancreatic cancer - age 82;  maternal aunt - melanoma - age 40's; paternal aunt - ovarian cancer - age 64; paternal grandfather - melanoma age 70's Paternal side:   genetic mutations for BRIP 1 Patient had genetic testing via [facility] on 10/17/23 with Dr. Clark's office - results pending.   Health Maintenance: Mammogram: none Colonoscopy: none PAP:  Never

## 2023-12-28 ENCOUNTER — TRANSCRIPTION ENCOUNTER (OUTPATIENT)
Age: 38
End: 2023-12-28

## 2023-12-28 ENCOUNTER — INPATIENT (INPATIENT)
Facility: HOSPITAL | Age: 38
LOS: 0 days | Discharge: HOME | End: 2023-12-29
Attending: OBSTETRICS & GYNECOLOGY | Admitting: OBSTETRICS & GYNECOLOGY
Payer: COMMERCIAL

## 2023-12-28 ENCOUNTER — RESULT REVIEW (OUTPATIENT)
Age: 38
End: 2023-12-28

## 2023-12-28 ENCOUNTER — APPOINTMENT (OUTPATIENT)
Dept: GYNECOLOGIC ONCOLOGY | Facility: HOSPITAL | Age: 38
End: 2023-12-28

## 2023-12-28 VITALS
HEART RATE: 83 BPM | RESPIRATION RATE: 14 BRPM | HEIGHT: 62.5 IN | TEMPERATURE: 98 F | DIASTOLIC BLOOD PRESSURE: 92 MMHG | SYSTOLIC BLOOD PRESSURE: 138 MMHG | WEIGHT: 242.07 LBS | OXYGEN SATURATION: 100 %

## 2023-12-28 DIAGNOSIS — Z90.49 ACQUIRED ABSENCE OF OTHER SPECIFIED PARTS OF DIGESTIVE TRACT: Chronic | ICD-10-CM

## 2023-12-28 DIAGNOSIS — Z87.39 PERSONAL HISTORY OF OTHER DISEASES OF THE MUSCULOSKELETAL SYSTEM AND CONNECTIVE TISSUE: Chronic | ICD-10-CM

## 2023-12-28 DIAGNOSIS — R19.00 INTRA-ABDOMINAL AND PELVIC SWELLING, MASS AND LUMP, UNSPECIFIED SITE: ICD-10-CM

## 2023-12-28 LAB
GLUCOSE BLDC GLUCOMTR-MCNC: 107 MG/DL — HIGH (ref 70–99)
GLUCOSE BLDC GLUCOMTR-MCNC: 107 MG/DL — HIGH (ref 70–99)
HCG UR QL: NEGATIVE — SIGNIFICANT CHANGE UP
HCG UR QL: NEGATIVE — SIGNIFICANT CHANGE UP
HCT VFR BLD CALC: 35.1 % — SIGNIFICANT CHANGE UP (ref 34.5–45)
HCT VFR BLD CALC: 35.1 % — SIGNIFICANT CHANGE UP (ref 34.5–45)
HGB BLD-MCNC: 10.8 G/DL — LOW (ref 11.5–15.5)
HGB BLD-MCNC: 10.8 G/DL — LOW (ref 11.5–15.5)
MCHC RBC-ENTMCNC: 27.1 PG — SIGNIFICANT CHANGE UP (ref 27–34)
MCHC RBC-ENTMCNC: 27.1 PG — SIGNIFICANT CHANGE UP (ref 27–34)
MCHC RBC-ENTMCNC: 30.8 GM/DL — LOW (ref 32–36)
MCHC RBC-ENTMCNC: 30.8 GM/DL — LOW (ref 32–36)
MCV RBC AUTO: 88 FL — SIGNIFICANT CHANGE UP (ref 80–100)
MCV RBC AUTO: 88 FL — SIGNIFICANT CHANGE UP (ref 80–100)
NRBC # BLD: 0 /100 WBCS — SIGNIFICANT CHANGE UP (ref 0–0)
NRBC # BLD: 0 /100 WBCS — SIGNIFICANT CHANGE UP (ref 0–0)
NRBC # FLD: 0 K/UL — SIGNIFICANT CHANGE UP (ref 0–0)
NRBC # FLD: 0 K/UL — SIGNIFICANT CHANGE UP (ref 0–0)
PLATELET # BLD AUTO: 322 K/UL — SIGNIFICANT CHANGE UP (ref 150–400)
PLATELET # BLD AUTO: 322 K/UL — SIGNIFICANT CHANGE UP (ref 150–400)
RBC # BLD: 3.99 M/UL — SIGNIFICANT CHANGE UP (ref 3.8–5.2)
RBC # BLD: 3.99 M/UL — SIGNIFICANT CHANGE UP (ref 3.8–5.2)
RBC # FLD: 12.6 % — SIGNIFICANT CHANGE UP (ref 10.3–14.5)
RBC # FLD: 12.6 % — SIGNIFICANT CHANGE UP (ref 10.3–14.5)
RH IG SCN BLD-IMP: POSITIVE — SIGNIFICANT CHANGE UP
RH IG SCN BLD-IMP: POSITIVE — SIGNIFICANT CHANGE UP
WBC # BLD: 20.23 K/UL — HIGH (ref 3.8–10.5)
WBC # BLD: 20.23 K/UL — HIGH (ref 3.8–10.5)
WBC # FLD AUTO: 20.23 K/UL — HIGH (ref 3.8–10.5)
WBC # FLD AUTO: 20.23 K/UL — HIGH (ref 3.8–10.5)

## 2023-12-28 PROCEDURE — 45330 DIAGNOSTIC SIGMOIDOSCOPY: CPT

## 2023-12-28 PROCEDURE — 88112 CYTOPATH CELL ENHANCE TECH: CPT | Mod: 26

## 2023-12-28 PROCEDURE — 88305 TISSUE EXAM BY PATHOLOGIST: CPT | Mod: 26

## 2023-12-28 PROCEDURE — 88307 TISSUE EXAM BY PATHOLOGIST: CPT | Mod: 26

## 2023-12-28 DEVICE — VISTASEAL FIBRIN HUMAN 10ML: Type: IMPLANTABLE DEVICE | Site: BILATERAL | Status: FUNCTIONAL

## 2023-12-28 RX ORDER — KETOROLAC TROMETHAMINE 30 MG/ML
30 SYRINGE (ML) INJECTION EVERY 8 HOURS
Refills: 0 | Status: DISCONTINUED | OUTPATIENT
Start: 2023-12-28 | End: 2023-12-29

## 2023-12-28 RX ORDER — ACETAMINOPHEN 500 MG
3 TABLET ORAL
Qty: 0 | Refills: 0 | DISCHARGE

## 2023-12-28 RX ORDER — SIMETHICONE 80 MG/1
80 TABLET, CHEWABLE ORAL EVERY 6 HOURS
Refills: 0 | Status: DISCONTINUED | OUTPATIENT
Start: 2023-12-28 | End: 2023-12-29

## 2023-12-28 RX ORDER — OXYCODONE HYDROCHLORIDE 5 MG/1
5 TABLET ORAL
Refills: 0 | Status: DISCONTINUED | OUTPATIENT
Start: 2023-12-28 | End: 2023-12-29

## 2023-12-28 RX ORDER — CETIRIZINE HYDROCHLORIDE 10 MG/1
1 TABLET ORAL
Refills: 0 | DISCHARGE

## 2023-12-28 RX ORDER — SODIUM CHLORIDE 9 MG/ML
1000 INJECTION, SOLUTION INTRAVENOUS
Refills: 0 | Status: DISCONTINUED | OUTPATIENT
Start: 2023-12-28 | End: 2023-12-28

## 2023-12-28 RX ORDER — ONDANSETRON 8 MG/1
8 TABLET, FILM COATED ORAL EVERY 8 HOURS
Refills: 0 | Status: DISCONTINUED | OUTPATIENT
Start: 2023-12-28 | End: 2023-12-29

## 2023-12-28 RX ORDER — ONDANSETRON 8 MG/1
4 TABLET, FILM COATED ORAL ONCE
Refills: 0 | Status: COMPLETED | OUTPATIENT
Start: 2023-12-28 | End: 2023-12-28

## 2023-12-28 RX ORDER — HYDROMORPHONE HYDROCHLORIDE 2 MG/ML
1 INJECTION INTRAMUSCULAR; INTRAVENOUS; SUBCUTANEOUS
Refills: 0 | Status: DISCONTINUED | OUTPATIENT
Start: 2023-12-28 | End: 2023-12-29

## 2023-12-28 RX ORDER — ACETAMINOPHEN 500 MG
1000 TABLET ORAL EVERY 6 HOURS
Refills: 0 | Status: DISCONTINUED | OUTPATIENT
Start: 2023-12-28 | End: 2023-12-29

## 2023-12-28 RX ORDER — SODIUM CHLORIDE 9 MG/ML
1000 INJECTION, SOLUTION INTRAVENOUS
Refills: 0 | Status: DISCONTINUED | OUTPATIENT
Start: 2023-12-28 | End: 2023-12-29

## 2023-12-28 RX ORDER — HYDROMORPHONE HYDROCHLORIDE 2 MG/ML
0.5 INJECTION INTRAMUSCULAR; INTRAVENOUS; SUBCUTANEOUS
Refills: 0 | Status: DISCONTINUED | OUTPATIENT
Start: 2023-12-28 | End: 2023-12-29

## 2023-12-28 RX ORDER — SENNA PLUS 8.6 MG/1
2 TABLET ORAL AT BEDTIME
Refills: 0 | Status: DISCONTINUED | OUTPATIENT
Start: 2023-12-28 | End: 2023-12-29

## 2023-12-28 RX ORDER — OXYCODONE HYDROCHLORIDE 5 MG/1
1 TABLET ORAL
Qty: 15 | Refills: 0
Start: 2023-12-28

## 2023-12-28 RX ORDER — IBUPROFEN 200 MG
1 TABLET ORAL
Qty: 0 | Refills: 0 | DISCHARGE

## 2023-12-28 RX ADMIN — HYDROMORPHONE HYDROCHLORIDE 0.5 MILLIGRAM(S): 2 INJECTION INTRAMUSCULAR; INTRAVENOUS; SUBCUTANEOUS at 20:15

## 2023-12-28 RX ADMIN — SODIUM CHLORIDE 125 MILLILITER(S): 9 INJECTION, SOLUTION INTRAVENOUS at 19:00

## 2023-12-28 RX ADMIN — SODIUM CHLORIDE 3 MILLILITER(S): 9 INJECTION INTRAMUSCULAR; INTRAVENOUS; SUBCUTANEOUS at 21:12

## 2023-12-28 RX ADMIN — Medication 30 MILLIGRAM(S): at 21:30

## 2023-12-28 RX ADMIN — Medication 30 MILLIGRAM(S): at 21:12

## 2023-12-28 RX ADMIN — ONDANSETRON 4 MILLIGRAM(S): 8 TABLET, FILM COATED ORAL at 21:41

## 2023-12-28 RX ADMIN — HYDROMORPHONE HYDROCHLORIDE 0.5 MILLIGRAM(S): 2 INJECTION INTRAMUSCULAR; INTRAVENOUS; SUBCUTANEOUS at 19:56

## 2023-12-28 NOTE — BRIEF OPERATIVE NOTE - NSICDXBRIEFPREOP_GEN_ALL_CORE_FT
PRE-OP DIAGNOSIS:  Severe endometriosis 28-Dec-2023 18:57:14  Felipe Arroyo  Bilateral ovarian cysts 28-Dec-2023 18:57:45  Felipe Arroyo  Pelvic mass in female 28-Dec-2023 18:58:01  Felipe Arroyo   PRE-OP DIAGNOSIS:  Severe endometriosis 28-Dec-2023 18:57:14  Felipe Arroyo  Bilateral ovarian cysts 28-Dec-2023 18:57:45  Felipe Arroyo  Pelvic mass in female 28-Dec-2023 18:58:01  Felpie Arroyo

## 2023-12-28 NOTE — BRIEF OPERATIVE NOTE - ASSISTANT(S)
NEW PATIENT VISIT PRE-VISIT PLANNING    1.  EpicCare Patient is checked in Patient Demographics?Yes    2.  Immunizations were updated in Epic using Reconcile Outside Information activity? Yes         3.  Is this appointment scheduled as a Hospital Follow-Up? No    4.  Patient is due for the following Health Maintenance Topics:   Health Maintenance Due   Topic Date Due    ABDOMINAL AORTIC ANEURYSM (AAA) SCREEN  Never done    RETINAL SCREENING  06/22/2022    DIABETES MONOFILAMENT / LE EXAM  10/20/2022    COVID-19 Vaccine (6 - Pfizer series) 02/27/2023    FASTING LIPID PROFILE  04/02/2023    URINE ACR / MICROALBUMIN  04/02/2023    A1C SCREENING  07/06/2023   5.  Reviewed/Updated the following with patient:          Preferred Pharmacy? Yes          Preferred Lab? Yes          Preferred Communication? Yes          Allergies? Yes          Medications? YES. Was Abstract Encounter opened and chart updated? YES    6.  Updated Care Team?          DME Company (gait device, O2, CPAP, etc.) N\A          Other Specialists (eye doctor, derm, GYN, cardiology, endo, etc): YES    7.  AHA (Puls8) form printed for Provider? N/A    Dr. Arroyo, PGY-2

## 2023-12-28 NOTE — DISCHARGE NOTE PROVIDER - HOSPITAL COURSE
Patient underwent a robot assisted total laparoscopy hysterectomy, bilateral salpingo-oophorectomy, extensive lysis of adnesions, lysis of adhesions surrounding bilateral adnexa, ureterolysis, cystoscopy, TAP blocks, and colonoscopy (latter performed by Colorectal). EBL: 300. Please see operative note for details. On POD#0, patient's pain was well controlled with IV Tylenol, Toradol, and Oxycodone, and patient tolerated clears. Patient's diet was advanced and she tolerated regular diet without issues. Labs drawn post-operatively and on POD#1 were stable compared to pre-op. Oliver catheter was discontinued and patient voided without issues. PT was consulted and evaluated patient on POD#1. Routine post-operative care was performed.  No notable events.     Upon discharge on POD#___ , the patient was ambulating per her baseline, voiding spontaneously, tolerating oral intake, pain was well controlled with oral medication, and vital signs were stable. Patient was instructed to follow up with Dr. Myers in 2 weeks. Patient also instructed to follow up with her GI doctor for polyp seen on colonoscopy. Patient underwent a robot assisted total laparoscopy hysterectomy, bilateral salpingo-oophorectomy, extensive lysis of adnesions, lysis of adhesions surrounding bilateral adnexa, ureterolysis, cystoscopy, TAP blocks, and colonoscopy (latter performed by Colorectal). EBL: 300. Please see operative note for details. On POD#0, patient's pain was well controlled with IV Tylenol, Toradol, and Oxycodone, and patient tolerated clears. Patient's diet was advanced and she tolerated regular diet without issues. Labs drawn post-operatively and on POD#1 were stable compared to pre-op. Oliver catheter was discontinued and patient voided without issues. PT was consulted and evaluated patient. Per PT recommendations, no skilled PT needs.     Upon discharge on POD#1, the patient was ambulating per her baseline, voiding spontaneously, tolerating oral intake, pain was well controlled with oral medication, and vital signs stable. Patient was instructed to follow up with Dr. Myers in 2 weeks. Patient also instructed to follow up with her GI doctor for polyp seen on colonoscopy.               9.7    18.49 )-----------( 345      ( 12-29 @ 05:53 )             29.7                10.8   20.23 )-----------( 322      ( 12-28 @ 19:17 )             35.1

## 2023-12-28 NOTE — BRIEF OPERATIVE NOTE - NSICDXBRIEFPOSTOP_GEN_ALL_CORE_FT
POST-OP DIAGNOSIS:  Bilateral ovarian cysts 28-Dec-2023 18:58:31  Felipe Arroyo  Pelvic mass in female 28-Dec-2023 19:00:42  Felipe Arroyo  Severe endometriosis 28-Dec-2023 18:58:28  Felipe Arroyo

## 2023-12-28 NOTE — DISCHARGE NOTE PROVIDER - NSDCCPCAREPLAN_GEN_ALL_CORE_FT
PRINCIPAL DISCHARGE DIAGNOSIS  Diagnosis: Pelvic mass in female  Assessment and Plan of Treatment:       SECONDARY DISCHARGE DIAGNOSES  Diagnosis: Severe endometriosis  Assessment and Plan of Treatment:

## 2023-12-28 NOTE — DISCHARGE NOTE PROVIDER - NSDCCPTREATMENT_GEN_ALL_CORE_FT
PRINCIPAL PROCEDURE  Procedure: Hysterectomy, total, robot-assisted, laparoscopic, using da Julio C Xi, with bilateral salpingo-oophorectomy and cystoscopy  Findings and Treatment:       SECONDARY PROCEDURE  Procedure: Colonoscopy  Findings and Treatment: A polyp was seen on colonscopy. Please follow up outpatient for a colonoscopy for further evaluation.    Procedure: Lysis of pelvic adhesions  Findings and Treatment:

## 2023-12-28 NOTE — CHART NOTE - NSCHARTNOTEFT_GEN_A_CORE
Patient seen and examined at bedside, recently post-op. No acute complaints at this time. Denies CP, SOB, N/V, fevers, and chills.    Vital Signs Last 24 Hours  T(C): 36.6 (12-28-23 @ 21:00), Max: 36.6 (12-28-23 @ 21:00)  HR: 86 (12-28-23 @ 21:15) (72 - 86)  BP: 147/87 (12-28-23 @ 21:15) (129/84 - 160/88)  RR: 14 (12-28-23 @ 21:15) (14 - 21)  SpO2: 99% (12-28-23 @ 21:15) (97% - 100%)    I&O's Summary    28 Dec 2023 07:01  -  28 Dec 2023 21:28  --------------------------------------------------------  IN: 250 mL / OUT: 325 mL / NET: -75 mL        Physical Exam:  General: NAD  CV: well perfused  Lungs: breathing comfortably on RA  Abdomen: Soft, appropriately tender, mildly distended  Incision: robotic incisions are CDI, covered in op-site dressings  : servin in place draining pale yellow urine  Ext: No pain or swelling, SCDs in place    Labs:             10.8<L>  20.23<H> )-----------( 322      ( 12-28 @ 19:17 )             35.1         MEDICATIONS  (STANDING):  acetaminophen   IVPB .. 1000 milliGRAM(s) IV Intermittent every 6 hours  ketorolac   Injectable 30 milliGRAM(s) IV Push every 8 hours  lactated ringers. 1000 milliLiter(s) (125 mL/Hr) IV Continuous <Continuous>  lactated ringers. 1000 milliLiter(s) (30 mL/Hr) IV Continuous <Continuous>  senna 2 Tablet(s) Oral at bedtime  sodium chloride 0.9% lock flush 3 milliLiter(s) IV Push every 8 hours    MEDICATIONS  (PRN):  HYDROmorphone  Injectable 0.5 milliGRAM(s) IV Push every 10 minutes PRN Moderate Pain (4 - 6)  HYDROmorphone  Injectable 1 milliGRAM(s) IV Push every 10 minutes PRN Severe Pain (7 - 10)  ondansetron    Tablet 8 milliGRAM(s) Oral every 8 hours PRN Nausea and/or Vomiting  ondansetron Injectable 4 milliGRAM(s) IV Push once PRN Nausea and/or Vomiting  oxyCODONE    IR 5 milliGRAM(s) Oral every 3 hours PRN Moderate Pain (4 - 6)  simethicone 80 milliGRAM(s) Chew every 6 hours PRN Gas      39yo s/p RA TLH, BSO, cystoscopy, extensive lysis of adhesions, lysis of adhesions surrounding b/l adnexa, ureterolysis, b/l TAP blocks, colonoscopy recovering well in acute post-operative state.    Neuro: IV pain meds  CV: Hemodynamically stable. H/H 10.8/35.1  Pulm:  incentive spirometer  GI: Regular Diet   : Servin in place  Heme: hold HSQ until AM. SCDs for DVT PPX  ID: afebrile  Endo: no active issues  Dispo: continue routine post-op care    Dr. Myers aware  Alivia Boogie, PGY2 Patient seen and examined at bedside, recently post-op. No acute complaints at this time. Denies CP, SOB, N/V, fevers, and chills.    Vital Signs Last 24 Hours  T(C): 36.6 (12-28-23 @ 21:00), Max: 36.6 (12-28-23 @ 21:00)  HR: 86 (12-28-23 @ 21:15) (72 - 86)  BP: 147/87 (12-28-23 @ 21:15) (129/84 - 160/88)  RR: 14 (12-28-23 @ 21:15) (14 - 21)  SpO2: 99% (12-28-23 @ 21:15) (97% - 100%)    I&O's Summary    28 Dec 2023 07:01  -  28 Dec 2023 21:28  --------------------------------------------------------  IN: 250 mL / OUT: 325 mL / NET: -75 mL        Physical Exam:  General: NAD  CV: well perfused  Lungs: breathing comfortably on RA  Abdomen: Soft, appropriately tender, mildly distended  Incision: robotic incisions are CDI, covered in op-site dressings  : servin in place draining pale yellow urine  Ext: No pain or swelling, SCDs in place    Labs:             10.8<L>  20.23<H> )-----------( 322      ( 12-28 @ 19:17 )             35.1         MEDICATIONS  (STANDING):  acetaminophen   IVPB .. 1000 milliGRAM(s) IV Intermittent every 6 hours  ketorolac   Injectable 30 milliGRAM(s) IV Push every 8 hours  lactated ringers. 1000 milliLiter(s) (125 mL/Hr) IV Continuous <Continuous>  lactated ringers. 1000 milliLiter(s) (30 mL/Hr) IV Continuous <Continuous>  senna 2 Tablet(s) Oral at bedtime  sodium chloride 0.9% lock flush 3 milliLiter(s) IV Push every 8 hours    MEDICATIONS  (PRN):  HYDROmorphone  Injectable 0.5 milliGRAM(s) IV Push every 10 minutes PRN Moderate Pain (4 - 6)  HYDROmorphone  Injectable 1 milliGRAM(s) IV Push every 10 minutes PRN Severe Pain (7 - 10)  ondansetron    Tablet 8 milliGRAM(s) Oral every 8 hours PRN Nausea and/or Vomiting  ondansetron Injectable 4 milliGRAM(s) IV Push once PRN Nausea and/or Vomiting  oxyCODONE    IR 5 milliGRAM(s) Oral every 3 hours PRN Moderate Pain (4 - 6)  simethicone 80 milliGRAM(s) Chew every 6 hours PRN Gas      37yo s/p RA TLH, BSO, cystoscopy, extensive lysis of adhesions, lysis of adhesions surrounding b/l adnexa, ureterolysis, b/l TAP blocks, colonoscopy recovering well in acute post-operative state.    Neuro: IV pain meds  CV: Hemodynamically stable. H/H 10.8/35.1  Pulm:  incentive spirometer  GI: Regular Diet   : Servin in place  Heme: hold HSQ until AM. SCDs for DVT PPX  ID: afebrile  Endo: no active issues  Dispo: continue routine post-op care    Dr. Myers aware  Alivia Boogie, PGY2

## 2023-12-28 NOTE — BRIEF OPERATIVE NOTE - OPERATION/FINDINGS
EUA revealed normal external genitalia, small introitus with hymen intact, ~5mm vertical band of tissue at introitus, normal cervix, ~14w sized anteverted uterus, bilateral adnexal fullness palpated. LSC survey of the abdomen revealed atraumatic entry site, grossly normal liver edge, stomach, and bowel. LSC survey of the pelvis revealed enlarged uterus with endometriotic lesions along posterior surface of uterus and dense adhesions from posterior surface of uterus to rectum. B/l adnexa with large cystic masses >10cm c/w endometriomas draining chocolate brown thick fluid with dense adhesions throughout, obliterating anatomy of pelvic sidewall. Frozen section of uterus/cervix/left adnexa/right adnexa were benign. Cystoscopy at end of case revealed intact bladder and vigorous efflux from b/l UO. Colonoscopy performed by Colorectal revealed polyp but no defect. Vaginal cuff intact and rectal exam wnl at end of case. Excellent hemostasis noted.

## 2023-12-28 NOTE — DISCHARGE NOTE PROVIDER - CARE PROVIDER_API CALL
Rosaura Myers  Gynecologic Oncology  [address] [place] [id number]  Phone: [phone]  Fax: [phone]  Follow Up Time: 2 weeks   Rosaura Myers  Gynecologic Oncology  [address] [place] [id number]  Phone: [phone]  Fax: [phone]  Follow Up Time: 2 weeks

## 2023-12-28 NOTE — DISCHARGE NOTE PROVIDER - NSDCMRMEDTOKEN_GEN_ALL_CORE_FT
Aleve 220 mg oral capsule: 1 cap(s) orally as needed LD 12/19/2023  estradiol 0.025 mg/24 hours weekly transdermal film, extended release: 1 patch transdermally once a week  ibuprofen 200 mg oral tablet: 1 tab(s) orally every 6 hours  oxyCODONE 5 mg oral tablet: 1 tab(s) orally every 6 hours as needed for -for severe pain MDD: 4  Tylenol 325 mg oral tablet: 3 tab(s) orally every 6 hours  ZyrTEC 10 mg oral tablet: 1 tab(s) orally once a day   estradiol 0.1 mg/24 hours weekly transdermal film, extended release: 1 patch transdermally once a week  ibuprofen 200 mg oral tablet: 1 tab(s) orally every 6 hours  oxyCODONE 5 mg oral tablet: 1 tab(s) orally every 6 hours as needed for -for severe pain MDD: 4  Tylenol 325 mg oral tablet: 3 tab(s) orally every 6 hours  ZyrTEC 10 mg oral tablet: 1 tab(s) orally once a day

## 2023-12-28 NOTE — BRIEF OPERATIVE NOTE - NSICDXBRIEFPROCEDURE_GEN_ALL_CORE_FT
PROCEDURES:  Hysterectomy, total, robot-assisted, laparoscopic, using da Julio C Xi, with bilateral salpingo-oophorectomy and cystoscopy 28-Dec-2023 18:55:33  Felipe Arroyo  Lysis of pelvic adhesions 28-Dec-2023 18:56:32  Felipe Arroyo  Colonoscopy 28-Dec-2023 18:56:43  Felipe Arroyo

## 2023-12-28 NOTE — DISCHARGE NOTE PROVIDER - PROVIDER TOKENS
PROVIDER:[TOKEN:[[id number]],FOLLOWUP:[2 weeks]] PROVIDER:[TOKEN:[[id number]],FOLLOWUP:[2 weeks]]

## 2023-12-28 NOTE — DISCHARGE NOTE PROVIDER - NSDCFUADDINST_GEN_ALL_CORE_FT
Postoperative Instructions    For pain control, take the followin. Ibuprofen 600mg every 6 hours, take with food  2. Add Tylenol 975mg every 6 hours, alternated with ibuprofen  Tylenol and ibuprofen may be obtained over the counter.  3. Oxycodone 5mg every 6 hours as needed for severe pain. A prescription has been sent to your pharmacy.    Use Vivelle-Dot transdermal patch weekly to help with symptoms of surgical menopause. A prescription has been sent to your pharmacy.     Return to your regular way of eating.     Resume normal activity as tolerated, but no heavy lifting or strenuous activity for 6 weeks. Complete vaginal rest, no tampons, no douching, no tub bathing, no sexual activities for 6 weeks unless otherwise instructed by your doctor.      No driving while on narcotic pain medication.      Call your doctor with any signs and symptoms of infection such as fever (>100.4 F), chills, nausea or vomiting.  Call your doctor if you're unable to tolerate food or have difficulty urinating.  Call your doctor if you have pain that is not relieved by your prescribed medications. Call your doctor with redness or swelling at the incision site, fluid leakage or wound separation.    Notify your doctor with any other concerns. Follow up with Dr. Myers in 2 weeks for a post-operative appointment.

## 2023-12-29 ENCOUNTER — TRANSCRIPTION ENCOUNTER (OUTPATIENT)
Age: 38
End: 2023-12-29

## 2023-12-29 VITALS — TEMPERATURE: 98 F | OXYGEN SATURATION: 99 % | RESPIRATION RATE: 20 BRPM

## 2023-12-29 DIAGNOSIS — Z98.890 OTHER SPECIFIED POSTPROCEDURAL STATES: ICD-10-CM

## 2023-12-29 LAB
ALBUMIN SERPL ELPH-MCNC: 3.7 G/DL — SIGNIFICANT CHANGE UP (ref 3.3–5)
ALBUMIN SERPL ELPH-MCNC: 3.7 G/DL — SIGNIFICANT CHANGE UP (ref 3.3–5)
ALP SERPL-CCNC: 62 U/L — SIGNIFICANT CHANGE UP (ref 40–120)
ALP SERPL-CCNC: 62 U/L — SIGNIFICANT CHANGE UP (ref 40–120)
ALT FLD-CCNC: 21 U/L — SIGNIFICANT CHANGE UP (ref 4–33)
ALT FLD-CCNC: 21 U/L — SIGNIFICANT CHANGE UP (ref 4–33)
ANION GAP SERPL CALC-SCNC: 12 MMOL/L — SIGNIFICANT CHANGE UP (ref 7–14)
ANION GAP SERPL CALC-SCNC: 12 MMOL/L — SIGNIFICANT CHANGE UP (ref 7–14)
AST SERPL-CCNC: 24 U/L — SIGNIFICANT CHANGE UP (ref 4–32)
AST SERPL-CCNC: 24 U/L — SIGNIFICANT CHANGE UP (ref 4–32)
BILIRUB SERPL-MCNC: 0.5 MG/DL — SIGNIFICANT CHANGE UP (ref 0.2–1.2)
BILIRUB SERPL-MCNC: 0.5 MG/DL — SIGNIFICANT CHANGE UP (ref 0.2–1.2)
BUN SERPL-MCNC: 4 MG/DL — LOW (ref 7–23)
BUN SERPL-MCNC: 4 MG/DL — LOW (ref 7–23)
CALCIUM SERPL-MCNC: 8.5 MG/DL — SIGNIFICANT CHANGE UP (ref 8.4–10.5)
CALCIUM SERPL-MCNC: 8.5 MG/DL — SIGNIFICANT CHANGE UP (ref 8.4–10.5)
CHLORIDE SERPL-SCNC: 104 MMOL/L — SIGNIFICANT CHANGE UP (ref 98–107)
CHLORIDE SERPL-SCNC: 104 MMOL/L — SIGNIFICANT CHANGE UP (ref 98–107)
CO2 SERPL-SCNC: 23 MMOL/L — SIGNIFICANT CHANGE UP (ref 22–31)
CO2 SERPL-SCNC: 23 MMOL/L — SIGNIFICANT CHANGE UP (ref 22–31)
CREAT SERPL-MCNC: 0.28 MG/DL — LOW (ref 0.5–1.3)
CREAT SERPL-MCNC: 0.28 MG/DL — LOW (ref 0.5–1.3)
EGFR: 142 ML/MIN/1.73M2 — SIGNIFICANT CHANGE UP
EGFR: 142 ML/MIN/1.73M2 — SIGNIFICANT CHANGE UP
GLUCOSE SERPL-MCNC: 98 MG/DL — SIGNIFICANT CHANGE UP (ref 70–99)
GLUCOSE SERPL-MCNC: 98 MG/DL — SIGNIFICANT CHANGE UP (ref 70–99)
HCT VFR BLD CALC: 29.7 % — LOW (ref 34.5–45)
HCT VFR BLD CALC: 29.7 % — LOW (ref 34.5–45)
HGB BLD-MCNC: 9.7 G/DL — LOW (ref 11.5–15.5)
HGB BLD-MCNC: 9.7 G/DL — LOW (ref 11.5–15.5)
MAGNESIUM SERPL-MCNC: 2 MG/DL — SIGNIFICANT CHANGE UP (ref 1.6–2.6)
MAGNESIUM SERPL-MCNC: 2 MG/DL — SIGNIFICANT CHANGE UP (ref 1.6–2.6)
MCHC RBC-ENTMCNC: 27.9 PG — SIGNIFICANT CHANGE UP (ref 27–34)
MCHC RBC-ENTMCNC: 27.9 PG — SIGNIFICANT CHANGE UP (ref 27–34)
MCHC RBC-ENTMCNC: 32.7 GM/DL — SIGNIFICANT CHANGE UP (ref 32–36)
MCHC RBC-ENTMCNC: 32.7 GM/DL — SIGNIFICANT CHANGE UP (ref 32–36)
MCV RBC AUTO: 85.3 FL — SIGNIFICANT CHANGE UP (ref 80–100)
MCV RBC AUTO: 85.3 FL — SIGNIFICANT CHANGE UP (ref 80–100)
NON-GYNECOLOGICAL CYTOLOGY STUDY: SIGNIFICANT CHANGE UP
NON-GYNECOLOGICAL CYTOLOGY STUDY: SIGNIFICANT CHANGE UP
NRBC # BLD: 0 /100 WBCS — SIGNIFICANT CHANGE UP (ref 0–0)
NRBC # BLD: 0 /100 WBCS — SIGNIFICANT CHANGE UP (ref 0–0)
NRBC # FLD: 0 K/UL — SIGNIFICANT CHANGE UP (ref 0–0)
NRBC # FLD: 0 K/UL — SIGNIFICANT CHANGE UP (ref 0–0)
PHOSPHATE SERPL-MCNC: 3 MG/DL — SIGNIFICANT CHANGE UP (ref 2.5–4.5)
PHOSPHATE SERPL-MCNC: 3 MG/DL — SIGNIFICANT CHANGE UP (ref 2.5–4.5)
PLATELET # BLD AUTO: 345 K/UL — SIGNIFICANT CHANGE UP (ref 150–400)
PLATELET # BLD AUTO: 345 K/UL — SIGNIFICANT CHANGE UP (ref 150–400)
POTASSIUM SERPL-MCNC: 3.8 MMOL/L — SIGNIFICANT CHANGE UP (ref 3.5–5.3)
POTASSIUM SERPL-MCNC: 3.8 MMOL/L — SIGNIFICANT CHANGE UP (ref 3.5–5.3)
POTASSIUM SERPL-SCNC: 3.8 MMOL/L — SIGNIFICANT CHANGE UP (ref 3.5–5.3)
POTASSIUM SERPL-SCNC: 3.8 MMOL/L — SIGNIFICANT CHANGE UP (ref 3.5–5.3)
PROT SERPL-MCNC: 6.1 G/DL — SIGNIFICANT CHANGE UP (ref 6–8.3)
PROT SERPL-MCNC: 6.1 G/DL — SIGNIFICANT CHANGE UP (ref 6–8.3)
RBC # BLD: 3.48 M/UL — LOW (ref 3.8–5.2)
RBC # BLD: 3.48 M/UL — LOW (ref 3.8–5.2)
RBC # FLD: 12.7 % — SIGNIFICANT CHANGE UP (ref 10.3–14.5)
RBC # FLD: 12.7 % — SIGNIFICANT CHANGE UP (ref 10.3–14.5)
SODIUM SERPL-SCNC: 139 MMOL/L — SIGNIFICANT CHANGE UP (ref 135–145)
SODIUM SERPL-SCNC: 139 MMOL/L — SIGNIFICANT CHANGE UP (ref 135–145)
WBC # BLD: 18.49 K/UL — HIGH (ref 3.8–10.5)
WBC # BLD: 18.49 K/UL — HIGH (ref 3.8–10.5)
WBC # FLD AUTO: 18.49 K/UL — HIGH (ref 3.8–10.5)
WBC # FLD AUTO: 18.49 K/UL — HIGH (ref 3.8–10.5)

## 2023-12-29 RX ORDER — IBUPROFEN 200 MG
600 TABLET ORAL EVERY 6 HOURS
Refills: 0 | Status: DISCONTINUED | OUTPATIENT
Start: 2023-12-29 | End: 2023-12-29

## 2023-12-29 RX ORDER — HEPARIN SODIUM 5000 [USP'U]/ML
5000 INJECTION INTRAVENOUS; SUBCUTANEOUS EVERY 8 HOURS
Refills: 0 | Status: DISCONTINUED | OUTPATIENT
Start: 2023-12-29 | End: 2023-12-29

## 2023-12-29 RX ORDER — ACETAMINOPHEN 500 MG
975 TABLET ORAL EVERY 6 HOURS
Refills: 0 | Status: DISCONTINUED | OUTPATIENT
Start: 2023-12-29 | End: 2023-12-29

## 2023-12-29 RX ORDER — ONDANSETRON 8 MG/1
4 TABLET, FILM COATED ORAL EVERY 6 HOURS
Refills: 0 | Status: DISCONTINUED | OUTPATIENT
Start: 2023-12-29 | End: 2023-12-29

## 2023-12-29 RX ORDER — INFLUENZA VIRUS VACCINE 15; 15; 15; 15 UG/.5ML; UG/.5ML; UG/.5ML; UG/.5ML
0.5 SUSPENSION INTRAMUSCULAR ONCE
Refills: 0 | Status: DISCONTINUED | OUTPATIENT
Start: 2023-12-29 | End: 2023-12-29

## 2023-12-29 RX ADMIN — Medication 400 MILLIGRAM(S): at 00:51

## 2023-12-29 RX ADMIN — Medication 975 MILLIGRAM(S): at 12:02

## 2023-12-29 RX ADMIN — OXYCODONE HYDROCHLORIDE 5 MILLIGRAM(S): 5 TABLET ORAL at 12:02

## 2023-12-29 RX ADMIN — Medication 30 MILLIGRAM(S): at 06:01

## 2023-12-29 RX ADMIN — SODIUM CHLORIDE 3 MILLILITER(S): 9 INJECTION INTRAMUSCULAR; INTRAVENOUS; SUBCUTANEOUS at 05:56

## 2023-12-29 RX ADMIN — SODIUM CHLORIDE 125 MILLILITER(S): 9 INJECTION, SOLUTION INTRAVENOUS at 12:03

## 2023-12-29 RX ADMIN — ONDANSETRON 4 MILLIGRAM(S): 8 TABLET, FILM COATED ORAL at 00:51

## 2023-12-29 RX ADMIN — Medication 975 MILLIGRAM(S): at 13:02

## 2023-12-29 RX ADMIN — HEPARIN SODIUM 5000 UNIT(S): 5000 INJECTION INTRAVENOUS; SUBCUTANEOUS at 14:32

## 2023-12-29 RX ADMIN — Medication 600 MILLIGRAM(S): at 15:33

## 2023-12-29 RX ADMIN — Medication 600 MILLIGRAM(S): at 14:33

## 2023-12-29 RX ADMIN — SODIUM CHLORIDE 3 MILLILITER(S): 9 INJECTION INTRAMUSCULAR; INTRAVENOUS; SUBCUTANEOUS at 16:14

## 2023-12-29 RX ADMIN — Medication 30 MILLIGRAM(S): at 06:31

## 2023-12-29 RX ADMIN — Medication 1000 MILLIGRAM(S): at 06:30

## 2023-12-29 RX ADMIN — Medication 400 MILLIGRAM(S): at 06:00

## 2023-12-29 RX ADMIN — OXYCODONE HYDROCHLORIDE 5 MILLIGRAM(S): 5 TABLET ORAL at 13:02

## 2023-12-29 NOTE — PHYSICAL THERAPY INITIAL EVALUATION ADULT - PERTINENT HX OF CURRENT PROBLEM, REHAB EVAL
Pt is a 38 year old female with a past medical history of HIBM, BRIP1 mutation, complex L ovarian cyst and R hydrosalpinx, POD#1, s/p RA TLH, BSO, Cysto, extensive JUSTIN surrounding b/l adnexa, ureterolysis, and colonoscopy (Frozen=benign). Patient is doing well post-operatively.

## 2023-12-29 NOTE — PHYSICAL THERAPY INITIAL EVALUATION ADULT - IMPAIRMENTS CONTRIBUTING TO GAIT DEVIATIONS, PT EVAL
impaired balance/impaired coordination/impaired motor control circumduction gait pattern due to hip weakness/impaired balance/impaired coordination/impaired motor control

## 2023-12-29 NOTE — PROGRESS NOTE ADULT - ASSESSMENT
Assessment/Plan: 38y w/ hx of HIBM, BRIP1 mutation, complex L ovarian cyst and R hydrosalpinx, POD#1, s/p RA TLH, BSO, Cysto, extensive JUSTIN, JUSTIN surrounding b/l adnexa, ureterolysis, and colonoscopy.     Neuro: Continue IV Tylenol, Toradol, Oxy PRN for pain  s/p b/l TAP blocks  Hx of HIBM - f/u PT consult this AM  CV: Hemodynamically stable, f/u AM CBC this AM  Pulm: Saturating well on RA. Increase incentive spirometry.  GI: Advance diet as tolerated  : Servin in place. Adequate UOP. D/c servin this AM  Heme: ContinueVenodynes and start HSQ for DVT ppx. Increase OOB.    ID: Afebrile  Endo: F/u AM CMP, Mg, Phos  Dispo: continue inpatient care    Jigar Resendiz PGY1     Assessment/Plan: 38y w/ hx of HIBM, BRIP1 mutation, complex L ovarian cyst and R hydrosalpinx, POD#1, s/p RA TLH, BSO, Cysto, extensive JUSTIN surrounding b/l adnexa, ureterolysis, and colonoscopy (Frozen=benign). Patient is doing well post-operatively.    Neuro: Continue IV Tylenol, Toradol, Oxy PRN for pain  - Transition to po pain medication  - s/p b/l TAP blocks  Hx of HIBM   - f/u PT consult this AM  CV: Hemodynamically stable, f/u AM CBC  Pulm: Saturating well on RA. Increase incentive spirometry.  GI: Advance diet as tolerated  - LR@125. SLIV once tolerating po.  : Servin in place. Adequate UOP. D/c servin this AM  - f/u AM CMP/Mg/Phos  Heme: Continue Venodynes and start HSQ for DVT ppx. Increase OOB.    ID: Afebrile  Endo: F/u AM CMP, Mg, Phos  Dispo: continue inpatient care    seen w/ GYN ONC team  Jigar Resendiz PGY1

## 2023-12-29 NOTE — PATIENT PROFILE ADULT - FALL HARM RISK - HARM RISK INTERVENTIONS
Assistance with ambulation/Assistance OOB with selected safe patient handling equipment/Communicate Risk of Fall with Harm to all staff/Discuss with provider need for PT consult/Monitor gait and stability/Provide patient with walking aids - walker, cane, crutches/Reinforce activity limits and safety measures with patient and family/Sit up slowly, dangle for a short time, stand at bedside before walking/Tailored Fall Risk Interventions/Use of alarms - bed, chair and/or voice tab/Visual Cue: Yellow wristband and red socks/Bed in lowest position, wheels locked, appropriate side rails in place/Call bell, personal items and telephone in reach/Instruct patient to call for assistance before getting out of bed or chair/Non-slip footwear when patient is out of bed/[place] to call system/Physically safe environment - no spills, clutter or unnecessary equipment/Purposeful Proactive Rounding/Room/bathroom lighting operational, light cord in reach Assistance with ambulation/Assistance OOB with selected safe patient handling equipment/Communicate Risk of Fall with Harm to all staff/Discuss with provider need for PT consult/Monitor gait and stability/Provide patient with walking aids - walker, cane, crutches/Reinforce activity limits and safety measures with patient and family/Sit up slowly, dangle for a short time, stand at bedside before walking/Tailored Fall Risk Interventions/Use of alarms - bed, chair and/or voice tab/Visual Cue: Yellow wristband and red socks/Bed in lowest position, wheels locked, appropriate side rails in place/Call bell, personal items and telephone in reach/Instruct patient to call for assistance before getting out of bed or chair/Non-slip footwear when patient is out of bed/[place] to call system/Physically safe environment - no spills, clutter or unnecessary equipment/Purposeful Proactive Rounding/Room/bathroom lighting operational, light cord in reach

## 2023-12-29 NOTE — PHYSICAL THERAPY INITIAL EVALUATION ADULT - GENERAL OBSERVATIONS, REHAB EVAL
Chart reviewed and cleared for PT by STUART Haynes. Pt received sitting in bedside chair in NAD, all lines intact +IV, pulse ox, SpO2 100%, pts parents at bedside.

## 2023-12-29 NOTE — PROGRESS NOTE ADULT - PROBLEM SELECTOR PLAN 1
Gyn Onc Fellow Addendum:    Pt seen and examined at bedside. Agree with above.    VS reviewed  Labs reviewed    Hemodynamically stable   Continue current pain regimen  Reg diet   Oliver removed today, voiding spontaneously   HIBM: appreciate PT consult   Encourage IS use  Replete lytes prn  DVT ppx: HSQ, [place]   Dispo: anticipate discharge home today     Brittany Herrmann MD

## 2023-12-29 NOTE — PHYSICAL THERAPY INITIAL EVALUATION ADULT - ADDITIONAL COMMENTS
Pt lives with her parents in a house with a ramp and chair lift inside. Pt uses a rolling walker in the household and wheelchair in the community and requires some assistance with ADLs. Pt reports no falls in the past 6 months.   Pt left sitting in bedside chair in NAD, all lines intact, call yun in reach and STUART Haynes made aware.

## 2023-12-29 NOTE — DISCHARGE NOTE NURSING/CASE MANAGEMENT/SOCIAL WORK - NSDCPEFALRISK_GEN_ALL_CORE
For information on Fall & Injury Prevention, visit: [email or web address] OR  [email or web address] OR  https://www.cdc.gov/steadi/patient.html For information on Fall & Injury Prevention, visit: [email or web address] OR  [email or web address] OR  https://www.cdc.gov/steadi/patient.html

## 2023-12-29 NOTE — PROGRESS NOTE ADULT - ATTENDING COMMENTS
Patient seen and evaluated on am rounds with team.   Doing well.   Agree with above plan.   DC home today.

## 2023-12-29 NOTE — DISCHARGE NOTE NURSING/CASE MANAGEMENT/SOCIAL WORK - PATIENT PORTAL LINK FT
You can access the FollowMyHealth Patient Portal offered by [facility] by registering at the following website: [email or web address]. By joining [facility]’s FollowMyHealth portal, you will also be able to view your health information using other applications (apps) compatible with our system. You can access the FollowMyHealth Patient Portal offered by [facility] by registering at the following website: [email or web address]. By joining [facility]’s FollowMyHealth portal, you will also be able to view your health information using other applications (apps) compatible with our system.

## 2023-12-29 NOTE — PROGRESS NOTE ADULT - SUBJECTIVE AND OBJECTIVE BOX
R2 Gyn ONC Progress Note POD#1  HD#2    Subjective:   Pt seen and examined at bedside. No events overnight. Pt still with urinary servin. Patient not yet ambulating or passing flatus. Has not tried tolerating reg diet. Pain well controlled. Pt denies fever, chills, chest pain, SOB, nausea, vomiting, lightheadedness, dizziness.      Objective:  T(F): 98.1 (12-29-23 @ 06:10), Max: 98.1 (12-29-23 @ 06:10)  HR: 94 (12-29-23 @ 06:10) (60 - 94)  BP: 124/75 (12-29-23 @ 06:10) (124/75 - 160/88)  RR: 16 (12-29-23 @ 06:10) (13 - 21)  SpO2: 100% (12-29-23 @ 06:10) (95% - 100%)  Wt(kg): --  I&O's Summary    28 Dec 2023 07:01  -  29 Dec 2023 06:38  --------------------------------------------------------  IN: 1405 mL / OUT: 825 mL / NET: 580 mL      CAPILLARY BLOOD GLUCOSE      POCT Blood Glucose.: 107 mg/dL (28 Dec 2023 11:00)      MEDICATIONS  (STANDING):  acetaminophen   IVPB .. 1000 milliGRAM(s) IV Intermittent every 6 hours  influenza   Vaccine 0.5 milliLiter(s) IntraMuscular once  ketorolac   Injectable 30 milliGRAM(s) IV Push every 8 hours  lactated ringers. 1000 milliLiter(s) (125 mL/Hr) IV Continuous <Continuous>  senna 2 Tablet(s) Oral at bedtime  sodium chloride 0.9% lock flush 3 milliLiter(s) IV Push every 8 hours    MEDICATIONS  (PRN):  ondansetron Injectable 4 milliGRAM(s) IV Push every 6 hours PRN Nausea and/or Vomiting  oxyCODONE    IR 5 milliGRAM(s) Oral every 3 hours PRN Moderate Pain (4 - 6)  simethicone 80 milliGRAM(s) Chew every 6 hours PRN Gas      Physical Exam:  Constitutional: NAD, A+O x3  CV: RR S1S2 no m/r/g  Lungs: CTA b/l, good air flow b/l  Abdomen: soft, mildly distended, appropriately-tender. no guarding, no rebound, normal bowel sounds  Incision: 4 port sites: clean, dry, intact  Extremities: no lower extremity edema or calf tenderness bilaterally; venodynes in place    LABS:               10.8   20.23 )-----------( 322      ( 12-28 @ 19:17 )             35.1

## 2023-12-29 NOTE — PHYSICAL THERAPY INITIAL EVALUATION ADULT - RANGE OF MOTION EXAMINATION, REHAB EVAL
pt unable to actively manipulate bilateral ankles due, passive ROM WFL/bilateral upper extremity ROM was WFL (within functional limits)/bilateral lower extremity ROM was WFL (within functional limits)

## 2023-12-29 NOTE — PHYSICAL THERAPY INITIAL EVALUATION ADULT - WEIGHT-BEARING RESTRICTIONS: SIT/STAND, REHAB EVAL
PATIENT DISCHARGE EDUCATION INSTRUCTION SHEET  REASONS TO CALL YOUR OBSTETRICIAN  · Persistent fever, shaking, chills (Temperature higher than 100.4) may indicate you have an infection  · Heavy bleeding: soaking more than 1 pad per hour; Passing clots an egg-sized clot or bigger may mean you have an postpartum hemorrhage  · Foul odor from vagina or bad smelling or discolored discharge or blood  · Breast infection (Mastitis symptoms); breast pain, chills, fever, redness or red streaks, may feel flu like symptoms  · Urinary pain, burning or frequency  · Incision that is not healing, increased redness, swelling, tenderness or pain, or any pus from episiotomy or  site may mean you have an infection  · Redness, swelling, warmth, or painful to touch in the calf area of your leg may mean you have a blood clot  · Severe or intensified depression, thoughts or feelings of wanting to hurt yourself or someone else   · Pain in chest, obstructed breathing or shortness of breath (trouble catching your breath) may mean you are having a postpartum complication. Call your provider immediately   · Headache that does not get better, even after taking medicine, a bad headache with vision changes or pain in the upper right area of your belly may mean you have high blood pressure or post birth preeclampsia. Call your provider immediately    HAND WASHING  All family and friends should wash their hands:  · Before and after holding the baby  · Before feeding the baby  · After using the restroom or changing the baby's diaper    WOUND CARE  Ask your physician for additional care instructions. In general:  ·  Incision:  · May shower and pat incision dry   · Keep the incision clean and dry  · There should not be any opening or pus from the incision  · Continue to walk at home 3 times a day   · Do NOT lift anything heavier than your baby (over 10 pounds)  · Encourage family to help participate in care of the  to allow  rest and mom time to heal  · Episiotomy/Laceration  · May use robert-spray bottle, witch hazel pads and dermaplast spray for comfort  · Use robert-spray bottle after urinating to cleanse perineal area  · To prevent burning during urination spray robert-water bottle on labial area   · Pat perineal area dry until episiotomy/laceration is healed  · Continue to use robert-bottle until bleeding stops as needed  · If have a 2nd degree laceration or greater, a Sitz bath can offer relief from soreness, burning, and inflammation   · Sitz Bath   · Sit in 6 inches of warm water and soak laceration as needed until the laceration heals    VAGINAL CARE AND BLEEDING  · Nothing inside vagina for 6 weeks:   · No sexual intercourse, tampons or douching  · Bleeding may continue for 2-4 weeks. Amount and color may vary  · Soaking 1 pad or more in an hour for several hours is considered heavy bleeding  · Passing large egg sized blood clots can be concerning  · If you feel like you have heavy bleeding or are having increasing amount of blood clots call your Obstetrician immediately  · If you begin feeling faint upon standing, feeling sick to your stomach, have clammy skin, a really fast heartbeat, have chills, start feeling confused, dizzy, sleepy or weak, or feeling like you're going to faint call your Obstetrician immediately    HYPERTENSION   Preeclampsia or gestational hypertension are types of high blood pressure that only pregnant women can get. It is important for you to be aware of symptoms to seek early intervention and treatment. If you have any of these symptoms immediately call your Obstetrician    · Vision changes or blurred vision   · Severe headache or pain that is unrelieved with medication and will not go away  · Persistent pain in upper abdomen or shoulder   · Increased swelling of face, feet, or hands  · Difficulty breathing or shortness of breath at rest  · Urinating less than usual    URINATION AND BOWEL MOVEMENTS  · Eating  "more fiber (bran cereal, fruits, and vegetables) and drinking plenty of fluids will help to avoid constipation  · Urinary frequency and urgency after childbirth is normal  · If you experience any urinary pain, burning or frequency call your provider    BIRTH CONTROL  · It is possible to become pregnant at any time after delivery and while breastfeeding  · Plan to discuss a method of birth control with your physician at your post delivery follow up visit    POSTPARTUM BLUES  During the first few days after birth, you may experience a sense of the \"blues\" which may include impatience, irritability or even crying. These feelings come and go quickly. However, as many as 1 in 10 women experience emotional symptoms known as postpartum depression.     POSTPARTUM DEPRESSION    May start as early as the second or third day after delivery or take several weeks or months to develop. Symptoms of \"blues\" are present, but are more intense: Crying spells; loss of appetite; feelings of hopelessness or loss of control; fear of touching the baby; over concern or no concern at all about the baby; little or no concern about your own appearance/caring for yourself; and/or inability to sleep or excessive sleeping. Contact your Obstetrician if you are experiencing any of these symptoms     PREVENTING SHAKEN BABY  If you are angry or stressed, PUT THE BABY IN THE CRIB, step away, take some deep breaths, and wait until you are calm to care for the baby. DO NOT SHAKE THE BABY. You are not alone, call a supporter for help.  · [facility] 24/7 crisis call line (226-156-4348) or (1-381.362.2645)  · You can also text them, text \"ANSWER\" ([id number])      " full weight-bearing

## 2023-12-29 NOTE — PHYSICAL THERAPY INITIAL EVALUATION ADULT - NSPTDISCHREC_GEN_A_CORE
Pt is at baseline and does not require hospital skilled PT services. Pt will be taken off PT program, re consult if needed./No skilled PT needs Pt is at baseline and does not require hospital skilled PT services. Pt will be taken off PT program, re consult if needed./Outpatient PT

## 2023-12-30 ENCOUNTER — NON-APPOINTMENT (OUTPATIENT)
Age: 38
End: 2023-12-30

## 2024-01-02 PROBLEM — R19.00 INTRA-ABDOMINAL AND PELVIC SWELLING, MASS AND LUMP, UNSPECIFIED SITE: Chronic | Status: ACTIVE | Noted: 2023-12-21

## 2024-01-02 PROBLEM — K35.32 ACUTE APPENDICITIS WITH PERFORATION, LOCALIZED PERITONITIS, AND GANGRENE, WITHOUT ABSCESS: Chronic | Status: ACTIVE | Noted: 2023-12-21

## 2024-01-02 PROBLEM — K59.09 OTHER CONSTIPATION: Chronic | Status: ACTIVE | Noted: 2023-12-21

## 2024-01-02 PROBLEM — E73.9 LACTOSE INTOLERANCE, UNSPECIFIED: Chronic | Status: ACTIVE | Noted: 2023-12-21

## 2024-01-02 PROBLEM — I89.0 LYMPHEDEMA, NOT ELSEWHERE CLASSIFIED: Chronic | Status: ACTIVE | Noted: 2023-12-21

## 2024-01-02 PROBLEM — N80.9 ENDOMETRIOSIS, UNSPECIFIED: Chronic | Status: ACTIVE | Noted: 2023-12-21

## 2024-01-03 ENCOUNTER — NON-APPOINTMENT (OUTPATIENT)
Age: 39
End: 2024-01-03

## 2024-01-09 ENCOUNTER — APPOINTMENT (OUTPATIENT)
Dept: GYNECOLOGIC ONCOLOGY | Facility: CLINIC | Age: 39
End: 2024-01-09
Payer: COMMERCIAL

## 2024-01-09 VITALS
DIASTOLIC BLOOD PRESSURE: 93 MMHG | HEART RATE: 81 BPM | SYSTOLIC BLOOD PRESSURE: 162 MMHG | BODY MASS INDEX: 39.87 KG/M2 | TEMPERATURE: 97.1 F | WEIGHT: 225 LBS | HEIGHT: 63 IN

## 2024-01-09 PROCEDURE — 99024 POSTOP FOLLOW-UP VISIT: CPT

## 2024-01-10 NOTE — ASSESSMENT
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  - Patients history and work up to date reviewed in detail. - We reviewed her intraoperative findings in detail and discussed the IOFS benign results. We also discussed that final pathology has not yet returned but we can review the results once back.  - She is overall doing well, only complaint is possibly the development of a low sacral/gluteal bed sore from more pressure with sitting in a similar position due to more limited mobility after surgery. Currently recommend wound care and avoidance of pressure to the region. S/sxs of infection provided and when to seek care sooner. - For close interval follow up in 2-3 weeks for vaginal cuff check. - I spent the time noted on the day of this patient encounter preparing for, providing and documenting the above service. I have counseled and educated the patient on the differential, workup, disease course, and treatment/management plan. Education was provided to the patient during this encounter. All questions and concerns were answered and addressed in detail.

## 2024-01-10 NOTE — REASON FOR VISIT
[Post Op] : post op visit [de-identified] : 12/28/2023 [de-identified] : Examination under anesthesia.  Robotic-assisted total laparoscopic hysterectomy,  bilateral salpingo-oophorectomy, bilateral ureterolysis, extensive lysis of adhesions surrounding the bilateral adnexae, cystoscopy, bilateral transversus abdominus plain block, intraoperative consultation for colonoscopy by Dr. Neely from [facility]. [de-identified] : Denies f/c/n/v/d/vaginal bleeding.  Overall feels well.  Meeting milestones of recovery.  Regular BM and voiding freely. We reviewed her intraoperative findings in detail and discussed the IOFS benign results. We also discussed that final pathology has not yet returned but we can review the results once back. She is overall doing well, only complaint is possibly the development of a low sacral/gluteal bed sore from more pressure with sitting in a similar position due to more limited mobility after surgery.   Final path: Pending.

## 2024-01-10 NOTE — LETTER BODY
[Dear  ___] : Dear  [unfilled], [I recently saw our patient [unfilled] for a follow-up visit.] : I recently saw our patient, [unfilled] for a follow-up visit. [Attached please find my note.] : Attached please find my note. [FreeTextEntry2] :   Patient is status post Examination under anesthesia.  Robotic-assisted total laparoscopic hysterectomy,  bilateral salpingo-oophorectomy, bilateral ureterolysis, extensive lysis of adhesions surrounding the bilateral adnexae, cystoscopy, bilateral transversus abdominus plain block, intraoperative consultation for colonoscopy by Dr. Neely from [facility] on 12/28/2023. [FreeTextEntry1] : Final path

## 2024-01-10 NOTE — DISCUSSION/SUMMARY
[Clean] : was clean [Dry] : was dry [Intact] : was intact [None] : had no drainage [Normal Skin] : normal appearance [Normal Skin Turgor] : normal skin turgor [Vaginal Exam Abnormal] : normal vaginal exam [Doing Well] : is doing well [Excellent Pain Control] : has excellent pain control [No Sign of Infection] : is showing no signs of infection [Erythema] : was not erythematous [Ecchymosis] : was not ecchymotic [Firm] : soft [Tender] : nontender [Abnormal Bowel Sounds] : normal bowel sounds [Rebound] : no rebound tenderness [Guarding] : no guarding [de-identified] : deferred, small bilateral gluteal pressure sores, no drainage, minimal erythema

## 2024-01-17 LAB — SURGICAL PATHOLOGY STUDY: SIGNIFICANT CHANGE UP

## 2024-01-22 ENCOUNTER — APPOINTMENT (OUTPATIENT)
Dept: GYNECOLOGIC ONCOLOGY | Facility: CLINIC | Age: 39
End: 2024-01-22

## 2024-01-29 PROBLEM — Z48.89 POSTOPERATIVE VISIT: Status: ACTIVE | Noted: 2024-01-08

## 2024-01-30 ENCOUNTER — APPOINTMENT (OUTPATIENT)
Dept: GYNECOLOGIC ONCOLOGY | Facility: CLINIC | Age: 39
End: 2024-01-30
Payer: COMMERCIAL

## 2024-01-30 VITALS
BODY MASS INDEX: 39.87 KG/M2 | TEMPERATURE: 97.2 F | SYSTOLIC BLOOD PRESSURE: 159 MMHG | DIASTOLIC BLOOD PRESSURE: 108 MMHG | HEIGHT: 63 IN | WEIGHT: 225 LBS

## 2024-01-30 DIAGNOSIS — Z48.89 ENCOUNTER FOR OTHER SPECIFIED SURGICAL AFTERCARE: ICD-10-CM

## 2024-01-30 DIAGNOSIS — E89.40 ASYMPTOMATIC POSTPROCEDURAL OVARIAN FAILURE: ICD-10-CM

## 2024-01-30 PROCEDURE — 99024 POSTOP FOLLOW-UP VISIT: CPT

## 2024-01-30 RX ORDER — ESTRADIOL 0.1 MG/D
0.1 PATCH, EXTENDED RELEASE TRANSDERMAL
Qty: 20 | Refills: 4 | Status: ACTIVE | COMMUNITY
Start: 2024-01-30 | End: 1900-01-01

## 2024-01-30 NOTE — REASON FOR VISIT
[Post Op] : post op visit [de-identified] : 12/28/2023 [de-identified] : Examination under anesthesia.  Robotic-assisted total laparoscopic hysterectomy,  bilateral salpingo-oophorectomy, bilateral ureterolysis, extensive lysis of adhesions surrounding the bilateral adnexae, cystoscopy, bilateral transversus abdominus plain block, intraoperative consultation for colonoscopy by Dr. Neely from [facility]. [de-identified] : Denies f/c/n/v/d/vaginal bleeding.  Overall feels well.  Meeting milestones of recovery.  Regular BM and voiding freely. We reviewed her intraoperative findings in detail and discussed the IOFS benign results as well as reviewed her final benign pathology results. She is overall doing well, has recent fall while in the shower but without injury. Vaginal cuff check offered but she deferred today as she felt unready. HRT is working well without issue and she would like a refill.   Final path reviewed: 1. Uterus and cervix, total hysterectomy -   Weakly proliferative endometrium -   Leiomyomata -   Adenomyosis and adenomyomata -   Cervix with no significant histopathological change -   Endometriosis involving fallopian tubes  2.  Adnexa, right, excision -   Endometriotic cyst with surface adhesions  3.  Adnexa, left, excision -   Endometriotic cyst with extensive surface adhesions and mesothelial proliferation  4.  Pelvic nodule, excision -  Blood clot

## 2024-01-30 NOTE — DISCUSSION/SUMMARY
[Clean] : was clean [Dry] : was dry [Intact] : was intact [None] : had no drainage [Normal Skin] : normal appearance [Normal Skin Turgor] : normal skin turgor [Doing Well] : is doing well [Excellent Pain Control] : has excellent pain control [No Sign of Infection] : is showing no signs of infection [Erythema] : was not erythematous [Ecchymosis] : was not ecchymotic [Firm] : soft [Tender] : nontender [Abnormal Bowel Sounds] : normal bowel sounds [Rebound] : no rebound tenderness [Guarding] : no guarding [de-identified] : deferred

## 2024-01-30 NOTE — ASSESSMENT
[FreeTextEntry1] : 37 yo G0 with genetic muscular dystrophy (HIBM), with severe deeply infiltrating endometriosis and BRIP1 mutation with symptomatic complex left ovarian cyst and right hydrosalpinx now s/p definitive surgical management here for her postoperative visit.  - Patients history and work up to date reviewed in detail. - We reviewed her intraoperative findings in detail and discussed the IOFS benign results. We also discussed that final pathology results were benign.  - She is overall doing well, and postoperative expectations reviewed - For close interval follow up in 2-3 months for vaginal cuff check - I spent the time noted on the day of this patient encounter preparing for, providing and documenting the above service. I have counseled and educated the patient on the differential, workup, disease course, and treatment/management plan. Education was provided to the patient during this encounter. All questions and concerns were answered and addressed in detail.

## 2024-02-29 ENCOUNTER — TRANSCRIPTION ENCOUNTER (OUTPATIENT)
Age: 39
End: 2024-02-29

## 2024-04-23 ENCOUNTER — APPOINTMENT (OUTPATIENT)
Dept: GYNECOLOGIC ONCOLOGY | Facility: CLINIC | Age: 39
End: 2024-04-23
Payer: COMMERCIAL

## 2024-04-23 VITALS — SYSTOLIC BLOOD PRESSURE: 142 MMHG | HEART RATE: 76 BPM | DIASTOLIC BLOOD PRESSURE: 92 MMHG | HEIGHT: 63 IN

## 2024-04-23 DIAGNOSIS — Z15.01 GENETIC SUSCEPTIBILITY TO MALIGNANT NEOPLASM OF BREAST: ICD-10-CM

## 2024-04-23 DIAGNOSIS — Z15.89 GENETIC SUSCEPTIBILITY TO MALIGNANT NEOPLASM OF BREAST: ICD-10-CM

## 2024-04-23 PROCEDURE — 99214 OFFICE O/P EST MOD 30 MIN: CPT

## 2024-04-23 PROCEDURE — 99459 PELVIC EXAMINATION: CPT

## 2024-04-23 NOTE — ASSESSMENT
[FreeTextEntry1] : 40 yo G0 with genetic muscular dystrophy (HIBM), and deeply infiltrating endometriosis with bilateral complex adnexal masses and BRIP1 mutation now s/p examination under anesthesia. Robotic-assisted total laparoscopic hysterectomy, bilateral salpingo-oophorectomy, bilateral ureterolysis, extensive lysis of adhesions surrounding the bilateral adnexa, cystoscopy, bilateral transversus abdominus plain block, intraoperative consultation for colonoscopy by Dr. Neely from [facility]

## 2024-04-23 NOTE — HISTORY OF PRESENT ILLNESS
[FreeTextEntry1] : ED/ref: [facility] GYN: Dr. Clark  PCP:  Dr. Keith Leventhal Neuromuscular specialist ([facility]) - Dr. Moreno Duarte  Pt presents to today's visit with her mother.   Pt ambulates with a walker, presents today in a wheelchair Pt has b/l LE compression devices for b/l LE lymphadema.    Ms. Brar, 38 years old, virginal, referred through [facility] after presenting to [facility] with abdominal pain.  Work-up included imaging identifying a pelvic mass.  Of note, patient's medical history is significant for hereditary Inclusion Body Myopathies (HIBM). She ambulates with the assistance of a walker.   Denies f/c/n/v/d/changes to bowel or bladder habits.  Denies unintentional weight loss or gain.  Pt states she does have a history of constipation and 3 days after her visit to [place] ED, she had a bowel movement which resolved her abdominal pain.    Denies any other associated symptoms.    Interval history since [facility] visit of 2023: Consulted with colorectal, Dr. Neely with the plan for colorectal standby for surgery. Consulted with GI for bowel regimen perioperatively and postoperatively. Her genetic testing results returned (ordered by her prior GYN) revelaing BRIP1 mutation. This was extensively reviewed with the patient and her parents today. Her father noted this was the same mutation as his sister and niece who had ovarian cancer diagnosed in their 40's. We reviewed that with a BRIP1 pathogenic mutation, the absolute risk of ovarian cancer is reported to be between 5%-15% and NCCN guidelines recommend RRSO starting at age 45-50 years old. We reviewed the risks/benefits early menopause and future inability to have a biologic child with RRSO at the time of her currently procedure versus risk of malignancy given her family history, genetic predisposition and endometriosis. After extensive discussion, the patient desires to proceed with definitive surgery and risk reduction of cancer with hysterectomy and BSO. We discussed the risks/benefits of HRT after her surgery and she will think about it further but is inclined to have HRT.   Imaging: 10/10/2023 TVUS ([facility]) Uterus: 11.0 cm x 7.8 cm x 9.9 cm. . The uterine parenchyma is heterogeneous. Multiple fibroids are identified. Within the right posterior uterus there is a 4.6 x 4.2 x 3.4 cm intramural fibroid. A posterior subserosal fibroid measures 3.2 x 2.4 x 2.2 cm. A left intramural fibroid measures 4.1 x 3.2 x 2.6 cm. Endometrium: The endometrial echo complex cannot be visualized. Right ovary:There is a dilated fluid-filled tubular structure adjacent to the right ovary, suspicious for hydrosalpinx. The ovary measures 7.7 cm x 5.0 cm x 6.0 cm. . This measurement includes a 4.4 x 4.1 x 4.5 cm cystic structure with question of internal echoes, which is either a partially exophytic ovarian complex, or may represent portion of the dilated tube. Left ovary: A normal left ovary is not visualized. There is a large cystic structure of the left adnexa with associated septations. This measures 11.8 x 7.5 x 0.4 cm. Fluid: None visualized, though limited in evaluation secondary bowel gas. IMPRESSION: Fibroid uterus. Endometrium not visualized.  Question complex exophytic complex cyst of the right ovary. This may represent a hemorrhagic cyst or endometrioma. Alternatively, this may represent part of the adjacent fluid-filled dilated tubular structure of the right adnexa, suspicious for hydrosalpinx.  No normal left ovarian tissue identified. Complex cystic structure of the left adnexa with associated septations. This is increased in size compared to previous exam. Both benign and neoplastic etiologies are considerations. Advise further correlation with pre and postcontrast pelvic MRI for further delineation. [id number] MRI abdomen and pelvis ([facility]) LOWER CHEST: Within normal limits. LIVER: Within normal limits. BILE DUCTS: Normal caliber. GALLBLADDER: Within normal limits. SPLEEN: A few subcentimeter cysts. PANCREAS: Within normal limits. ADRENALS: Within normal limits. KIDNEYS/URETERS: No hydronephrosis. BLADDER: Within normal limits. REPRODUCTIVE ORGANS: UTERUS: 13.4 x 8.1 x 11.5 cm. Small posterior uterine fibroids as well as marked heterogeneity of the posterior myometrium with cystic changes and T1 bright foci concerning for invasive endometriosis. ENDOMETRIUM: 4 mm JUNCTIONAL ZONE: Markedly thickened posteriorly as above. RIGHT OVARY: 3.7 x 2.9 x 3.0 cm. LEFT OVARY: A 14.2 x 7.9 x 8.1 cm multi cystic/multi septated left adnexal lesion with peripheral ovarian tissue. Cysts contain hemorrhagic content atelectasis is most consistent with an endometrioma. This demonstrates increase in size from prior imaging dating to 2016. No evidence of internal enhancement. ADNEXA: A 10.5 x 4.9 x 4.9 cm tubular right adnexal structure adjacent to the right ovary containing hemorrhagic contents consistent with hematosalpinx. No evidence of internal enhancement. BOWEL: No bowel obstruction. Colonic diverticulosis. PERITONEUM: No ascites. VESSELS: Within normal limits. RETROPERITONEUM/LYMPH NODES: No lymphadenopathy. ABDOMINAL WALL: Within normal limits. BONES: Within normal limits. IMPRESSION: A 14.2 cm left ovarian endometrioma with interval growth from prior imaging dated to 2016. Marked right hematosalpinx. Concern for invasive endometriosis at the posterior uterus.  10/10/2023 CT A/P ([facility]) LOWER CHEST: Lung bases are clear. LIVER: Fatty infiltration. BILE DUCTS: Normal caliber. GALLBLADDER: Within normal limits. SPLEEN: Within normal limits. PANCREAS: Atrophic. ADRENALS: Within normal limits. KIDNEYS/URETERS: No hydronephrosis. Single or 2 adjacent calculi in the lower pole the right kidney measuring 8 mm. BLADDER: Normal caliber wall. Displaced anteriorly by the uterus. REPRODUCTIVE ORGANS: Multiseptated cystic lesions in the right and left adnexa measuring 11.1 x 7.2 x 10.4 cm on the left (series 602 image 62) and 9.6 x 4.7 x 5.2 cm on the right (series 602 image 57), previously 7.3 x 5.5 x 8.7 cm and 5.5 x 4.9 x 4.4 cm when remeasured with similar technique; scattered high attenuation foci again seen in the left which are incompletely characterized on this single phase study. Ovoid tubular appearing component at the superior aspect of the right adnexal cyst; hydrosalpinx cannot be excluded. Enlarged uterus measuring 12.7 x 7.5 x 11.9 cm with a suggestion of fibroids in the posterior uterine body. BOWEL: Colonic diverticulosis without evidence of diverticulitis. No bowel obstruction. Status post appendectomy. PERITONEUM: Small amount of free fluid in the pelvis. Mild infiltration of the fat in the cul-de-sac, nonspecific and may be related to fluid. VESSELS: Abdominal aorta normal in caliber. RETROPERITONEUM/LYMPH NODES: No lymphadenopathy. ABDOMINAL WALL: Unremarkable. BONES: Fatty atrophy of the musculature, greatest at the anterior aspect of the pelvis and in the proximal thighs. IMPRESSION: Bilateral multiseptated cystic lesions in the adnexa measuring 11.1 cm on the left and 9.6 cm on the right, increased in size since 2016; a pre and post IV contrast MRI of the pelvis is recommended for further evaluation; ovoid tubular appearing component of the right adnexal cyst; hydrosalpinx cannot be excluded. Enlarged uterus with a suggestion of uterine fibroids; continued attention is recommended on the MRI. Small amount of free fluid in the pelvis with mild nonspecific infiltration of the fat in the cul-de-sac, possibly related to the fluid.  Labs: 2023: Hemoglobin A1c = 5.0; CEA = 1.2; CA 19-9 = 45;  = 139 ([facility]) 10/23/23:  HbA1c = 5.1;  CEA = 1.3;   = 16;   = 182 ([facility])  Interval History: 2023 status post Examination under anesthesia. Robotic-assisted total laparoscopic hysterectomy, bilateral salpingo-oophorectomy, bilateral ureterolysis, extensive lysis of adhesions surrounding the bilateral adnexae, cystoscopy, bilateral transversus abdominus plain block, intraoperative consultation for colonoscopy by Dr. Neely from [facility].  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 Clark's office - results pending.   Health Maintenance: Mammogram: none Colonoscopy: none PAP:  Never

## 2024-04-23 NOTE — REVIEW OF SYSTEMS
[Negative] : Genitourinary [FreeTextEntry2] : baseline neurologic dysfunction with LE weakness  [de-identified] : known baseline LE edema

## 2024-04-23 NOTE — PHYSICAL EXAM
[Chaperone Present] : A chaperone was present in the examining room during all aspects of the physical examination [Ambulatory and capable of all self care but unable to carry out any work activities] : Status 2- Ambulatory and capable of all self care but unable to carry out any work activities. Up and about more than 50% of waking hours [[id number]] : A chaperone was present during the pelvic exam. [Absent] : Adnexa(ae): Absent [Normal] : Recto-Vaginal Exam: Normal [de-identified] : soft, obese, nontender

## 2024-04-23 NOTE — DISCUSSION/SUMMARY
[Reviewed Clinical Lab Test(s)] : Results of clinical tests were reviewed. [Reviewed Radiology Report(s)] : Radiology reports were reviewed. [Reviewed Radiology Film/Image(s)] : Images from radiology studies were reviewed and examined. [Discuss Alternatives/Risks/Benefits w/Patient] : All alternatives, risks, and benefits were discussed with the patient/family and all questions were answered.  Patient expressed good understanding and appreciates the importance of follow up as recommended. [Visit Time ___ Minutes] : [unfilled] minutes [Face to Face Time___ Minutes] : with [unfilled] minutes in face to face consultation. [FreeTextEntry1] : - Patients history and work up to date reviewed in detail. - Supportive counseling provided given the patients ongoing medical issues. 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 Given that breast cancer risks are not well defined, no guidelines exist about how to manage breast cancer risks in women with pathogenic variants in newly identified genes, including BRIP1. In such cases, breast cancer risk may still be assessed based on personal and family history. Such women may wish to discuss the option of RRM, particularly if they have a strong family history. Plan for referral to Dr. Gavi Ashley for breast cancer screening and discussion of the option of RRM if clinically indicated. - Patient also needs formal colonoscopy screening per intraoperative findings of colonic polyp found by Dr. Neely. - For follow up in 1 year or sooner as clinically indicated.  - Disucssed plan for continuation of HRT which she is tolerating well.  - Patient doing well and meeting all postoperative milestones.  - I spent the time noted on the day of this patient encounter preparing for, providing and documenting the above service. I have counseled and educated the patient on the differential, workup, disease course, and treatment/management plan. Education was provided to the patient during this encounter. All questions and concerns were answered and addressed in detail.

## 2024-04-23 NOTE — LETTER BODY
[Dear  ___] : Dear  [unfilled], [Attached please find my note.] : Attached please find my note. [I recently saw our patient [unfilled] for a follow-up visit.] : I recently saw our patient, [unfilled] for a follow-up visit. [FreeTextEntry2] :  s/p 12/28/2023 status post Examination under anesthesia. Robotic-assisted total laparoscopic hysterectomy, bilateral salpingo-oophorectomy, bilateral ureterolysis, extensive lysis of adhesions surrounding the bilateral adnexae, cystoscopy, bilateral transversus abdominus plain block, intraoperative consultation for colonoscopy by Dr. Neely from [facility].  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

## 2024-04-23 NOTE — PAST MEDICAL HISTORY
[Menstruating] : The patient is menstruating [Menarche Age ____] : age at menarche was [unfilled] [Definite ___ (Date)] : the last menstrual period was [unfilled] [Regular Cycle Intervals] : have been regular [Total Preg ___] : G[unfilled] [de-identified] : pt is virgin

## 2024-10-25 DIAGNOSIS — Z15.89 GENETIC SUSCEPTIBILITY TO MALIGNANT NEOPLASM OF BREAST: ICD-10-CM

## 2024-10-25 DIAGNOSIS — Z15.01 GENETIC SUSCEPTIBILITY TO MALIGNANT NEOPLASM OF BREAST: ICD-10-CM

## 2025-02-06 NOTE — PATIENT PROFILE ADULT - IS PATIENT PREGNANT?
Please follow instructions on patient education material.  Return to urgent care in 2 to 3 days if symptoms are not improving, immediately if you develop any new or worsening symptoms.     You received a Toradol injection in clinic - do not take NSAIDs (ibuprofen, naproxen, Advil, Motrin, Aleve) for the next 8 hours.   Use ice 10-15 min off for these first 24-48 hours  Rest your back   Consider back brace and support with good shoes when working   Take Rx medications as prescribed and use only when needed, not on a schedule.  Heating pad     Use good body mechanics when working/lifting. Stretch your abdominals, hamstrings, buttocks, and thighs frequently. Drink plenty of water.   If home routine and medications do not help your pain, please consider seeing a chiropractor, massage therapist or physical therapist even 1 time for manual manipulation.     You need to follow up with a PCP in 2-3 days to further evaluate and treat your ongoing back pain.      - If you start to have fevers 100.4+, significant lower back/lower abdominal pain, vomiting, chills/body aches, or worsening bladder/urination symptoms - go to the ER.   
no

## 2025-02-21 RX ORDER — ESTRADIOL 0.1 MG/D
0.1 PATCH TRANSDERMAL
Qty: 4 | Refills: 3 | Status: ACTIVE | COMMUNITY
Start: 2025-02-21 | End: 1900-01-01

## 2025-03-07 RX ORDER — ESTRADIOL 0.1 MG/D
0.1 PATCH TRANSDERMAL
Qty: 10 | Refills: 3 | Status: ACTIVE | COMMUNITY
Start: 2025-03-07 | End: 1900-01-01

## 2025-03-11 RX ORDER — ESTRADIOL 0.1 MG/D
0.1 PATCH, EXTENDED RELEASE TRANSDERMAL
Qty: 10 | Refills: 4 | Status: ACTIVE | COMMUNITY
Start: 2025-03-11 | End: 1900-01-01

## 2025-06-24 ENCOUNTER — APPOINTMENT (OUTPATIENT)
Dept: GASTROENTEROLOGY | Facility: CLINIC | Age: 40
End: 2025-06-24
Payer: COMMERCIAL

## 2025-06-24 VITALS
SYSTOLIC BLOOD PRESSURE: 143 MMHG | DIASTOLIC BLOOD PRESSURE: 80 MMHG | OXYGEN SATURATION: 99 % | WEIGHT: 215 LBS | HEIGHT: 63 IN | HEART RATE: 88 BPM | BODY MASS INDEX: 38.09 KG/M2

## 2025-06-24 PROBLEM — K63.5 COLON POLYP: Status: ACTIVE | Noted: 2025-06-24

## 2025-06-24 PROBLEM — K52.9 CHRONIC DIARRHEA: Status: ACTIVE | Noted: 2025-06-24

## 2025-06-24 PROCEDURE — 99214 OFFICE O/P EST MOD 30 MIN: CPT

## 2025-06-24 PROCEDURE — G2211 COMPLEX E/M VISIT ADD ON: CPT | Mod: NC

## 2025-06-24 RX ORDER — POLYETHYLENE GLYCOL 3350 AND ELECTROLYTES WITH LEMON FLAVOR 236; 22.74; 6.74; 5.86; 2.97 G/4L; G/4L; G/4L; G/4L; G/4L
236 POWDER, FOR SOLUTION ORAL
Qty: 1 | Refills: 0 | Status: ACTIVE | COMMUNITY
Start: 2025-06-24 | End: 1900-01-01

## 2025-07-03 RX ORDER — SODIUM SULFATE, POTASSIUM SULFATE AND MAGNESIUM SULFATE 1.6; 3.13; 17.5 G/177ML; G/177ML; G/177ML
17.5-3.13-1.6 SOLUTION ORAL
Qty: 2 | Refills: 0 | Status: ACTIVE | COMMUNITY
Start: 2025-07-03 | End: 1900-01-01

## 2025-07-15 ENCOUNTER — APPOINTMENT (OUTPATIENT)
Dept: GYNECOLOGIC ONCOLOGY | Facility: CLINIC | Age: 40
End: 2025-07-15
Payer: COMMERCIAL

## 2025-07-15 VITALS
RESPIRATION RATE: 18 BRPM | DIASTOLIC BLOOD PRESSURE: 82 MMHG | HEIGHT: 63 IN | SYSTOLIC BLOOD PRESSURE: 132 MMHG | OXYGEN SATURATION: 99 % | WEIGHT: 215 LBS | TEMPERATURE: 98.2 F | HEART RATE: 91 BPM | BODY MASS INDEX: 38.09 KG/M2

## 2025-07-15 PROCEDURE — 99214 OFFICE O/P EST MOD 30 MIN: CPT

## 2025-07-15 PROCEDURE — 99459 PELVIC EXAMINATION: CPT

## 2025-07-18 ENCOUNTER — NON-APPOINTMENT (OUTPATIENT)
Age: 40
End: 2025-07-18

## 2025-08-04 ENCOUNTER — NON-APPOINTMENT (OUTPATIENT)
Age: 40
End: 2025-08-04

## 2025-08-15 ENCOUNTER — RESULT REVIEW (OUTPATIENT)
Age: 40
End: 2025-08-15

## 2025-08-15 ENCOUNTER — OUTPATIENT (OUTPATIENT)
Dept: OUTPATIENT SERVICES | Facility: HOSPITAL | Age: 40
LOS: 1 days | End: 2025-08-15
Payer: COMMERCIAL

## 2025-08-15 ENCOUNTER — TRANSCRIPTION ENCOUNTER (OUTPATIENT)
Age: 40
End: 2025-08-15

## 2025-08-15 ENCOUNTER — APPOINTMENT (OUTPATIENT)
Dept: GASTROENTEROLOGY | Facility: HOSPITAL | Age: 40
End: 2025-08-15

## 2025-08-15 DIAGNOSIS — Z90.49 ACQUIRED ABSENCE OF OTHER SPECIFIED PARTS OF DIGESTIVE TRACT: Chronic | ICD-10-CM

## 2025-08-15 DIAGNOSIS — Z87.39 PERSONAL HISTORY OF OTHER DISEASES OF THE MUSCULOSKELETAL SYSTEM AND CONNECTIVE TISSUE: Chronic | ICD-10-CM

## 2025-08-15 DIAGNOSIS — R19.7 DIARRHEA, UNSPECIFIED: ICD-10-CM

## 2025-08-15 PROCEDURE — 88305 TISSUE EXAM BY PATHOLOGIST: CPT | Mod: 26

## 2025-08-15 PROCEDURE — 45380 COLONOSCOPY AND BIOPSY: CPT | Mod: 59

## 2025-08-15 PROCEDURE — 45385 COLONOSCOPY W/LESION REMOVAL: CPT

## 2025-08-15 PROCEDURE — 88305 TISSUE EXAM BY PATHOLOGIST: CPT

## 2025-08-15 DEVICE — CLIP RESOLUTION 360 235CM: Type: IMPLANTABLE DEVICE | Status: FUNCTIONAL

## 2025-08-18 LAB — SURGICAL PATHOLOGY STUDY: SIGNIFICANT CHANGE UP

## 2025-09-03 DIAGNOSIS — K51.20 ULCERATIVE (CHRONIC) PROCTITIS W/OUT COMPLICATIONS: ICD-10-CM

## 2025-09-03 LAB — CALPROTECTIN FECAL: 2430 UG/G

## 2025-09-08 LAB — CRP SERPL-MCNC: 15 MG/L

## 2025-09-09 LAB
BASOPHILS # BLD AUTO: 0.05 K/UL
BASOPHILS NFR BLD AUTO: 0.5 %
EOSINOPHIL # BLD AUTO: 0.15 K/UL
EOSINOPHIL NFR BLD AUTO: 1.4 %
ERYTHROCYTE [SEDIMENTATION RATE] IN BLOOD BY WESTERGREN METHOD: 64 MM/HR
HBV CORE IGG+IGM SER QL: NONREACTIVE
HBV SURFACE AB SERPL IA-ACNC: 126 MIU/ML
HBV SURFACE AG SER QL: NONREACTIVE
HCT VFR BLD CALC: 40.4 %
HCV AB SER QL: NONREACTIVE
HCV S/CO RATIO: 0.07 S/CO
HGB BLD-MCNC: 12.7 G/DL
IMM GRANULOCYTES NFR BLD AUTO: 0.4 %
LYMPHOCYTES # BLD AUTO: 3.37 K/UL
LYMPHOCYTES NFR BLD AUTO: 30.6 %
MAN DIFF?: NORMAL
MCHC RBC-ENTMCNC: 27.9 PG
MCHC RBC-ENTMCNC: 31.4 G/DL
MCV RBC AUTO: 88.8 FL
MONOCYTES # BLD AUTO: 0.66 K/UL
MONOCYTES NFR BLD AUTO: 6 %
NEUTROPHILS # BLD AUTO: 6.74 K/UL
NEUTROPHILS NFR BLD AUTO: 61.1 %
PLATELET # BLD AUTO: 315 K/UL
RBC # BLD: 4.55 M/UL
RBC # FLD: 12.5 %
WBC # FLD AUTO: 11.01 K/UL

## 2025-09-10 LAB
M TB IFN-G BLD-IMP: NEGATIVE
QUANTIFERON TB PLUS MITOGEN MINUS NIL: 6.48 IU/ML
QUANTIFERON TB PLUS NIL: 0.02 IU/ML
QUANTIFERON TB PLUS TB1 MINUS NIL: 0 IU/ML
QUANTIFERON TB PLUS TB2 MINUS NIL: 0 IU/ML

## (undated) DEVICE — SYR LUER LOK 10CC

## (undated) DEVICE — SUT VLOC 180 2-0 6" GS-22 GREEN

## (undated) DEVICE — TROCAR SURGIQUEST AIRSEAL 8MMX100MM

## (undated) DEVICE — ELCTR GROUNDING PAD ADULT COVIDIEN

## (undated) DEVICE — SUT MONOCRYL 4-0 27" PS-2 UNDYED

## (undated) DEVICE — POLY TRAP ETRAP

## (undated) DEVICE — VALVE BIOPSY

## (undated) DEVICE — XI ARM FORCEP FENESTRATED BIPOLAR 8MM

## (undated) DEVICE — SYR LUER SLIP TIP 50CC

## (undated) DEVICE — XI DRAPE COLUMN

## (undated) DEVICE — GLV 6.5 PROTEXIS (WHITE)

## (undated) DEVICE — SYR LUER SLIP TIP 30CC

## (undated) DEVICE — SET IV PUMP BLOOD 1VALVE 180FILTER NON-DEHP

## (undated) DEVICE — UTERINE MANIPULATOR CONMED VCARE LG 37MM

## (undated) DEVICE — PACK PERI GYN

## (undated) DEVICE — SNARE LRG

## (undated) DEVICE — Device

## (undated) DEVICE — INZII RETRIEVAL SYSTEM 5MM

## (undated) DEVICE — UTERINE MANIPULATOR CONMED VCARE SM 32MM

## (undated) DEVICE — ENDOCATCH 10MM SPECIMEN POUCH

## (undated) DEVICE — VENODYNE/SCD SLEEVE CALF MEDIUM

## (undated) DEVICE — TRAP SPECIMEN SPUTUM 40CC

## (undated) DEVICE — FORMALIN CUPS 10% BUFFERED

## (undated) DEVICE — TUBING STRYKEFLOW II SUCTION / IRRIGATOR

## (undated) DEVICE — POSITIONER STRAP ARMBOARD VELCRO TS-30

## (undated) DEVICE — SUT VICRYL 0 27" CT-2 UNDYED

## (undated) DEVICE — TUBING IV SET GRAVITY 3Y 100" MACRO

## (undated) DEVICE — CATH ELCTR GLIDE PRB 7FR

## (undated) DEVICE — LUBRICANT INST ELECTROLUBE Z SOLUTION

## (undated) DEVICE — STERIS DEFENDO 3-PIECE KIT (AIR/WATER, SUCTION & BIOPSY VALVES)

## (undated) DEVICE — DRSG DERMABOND 0.7ML

## (undated) DEVICE — ENDOCUFF VISION SZ 2 LG GRN

## (undated) DEVICE — PACK ROBOTIC LIJ

## (undated) DEVICE — SENSOR O2 FINGER XL ADULT 24/BX 6BX/CA

## (undated) DEVICE — DRSG GAUZE PACKTNER ROLL

## (undated) DEVICE — SNARE CAPTIVATOR II RND COLD 10MM

## (undated) DEVICE — MASK O2 NON REBREATH 3IN1 ADULT

## (undated) DEVICE — XI DRAPE ARM

## (undated) DEVICE — PLATE NESSY 170

## (undated) DEVICE — SPECULUM VAGINAL MED DISP

## (undated) DEVICE — XI ARM NEEDLE DRIVER LARGE

## (undated) DEVICE — CANISTER SUCTION 1200CC 10/SL

## (undated) DEVICE — PACK D&C

## (undated) DEVICE — TROCAR SURGIQUEST AIRSEAL 5MM X 100MM

## (undated) DEVICE — XI VESSEL SEALER

## (undated) DEVICE — ELCTR BOVIE PENCIL SMOKE EVACUATION

## (undated) DEVICE — LUBRICATING JELLY ONESHOT 1.25OZ

## (undated) DEVICE — SYR IV POSIFLUSH NS 3ML 30/TY

## (undated) DEVICE — CLAMP BX HOT RAD JAW 3

## (undated) DEVICE — GLV 6 PROTEXIS (WHITE)

## (undated) DEVICE — SOL IRR POUR H2O 500ML

## (undated) DEVICE — FORCEP RADIAL JAW 4 W NDL 2.4MM 2.8MM 240CM ORANGE DISP

## (undated) DEVICE — PACK IV START WITH CHG

## (undated) DEVICE — XI ARM FORCEP PROGRASP 8MM

## (undated) DEVICE — TUBING IV SET SECONDARY 34"

## (undated) DEVICE — D HELP - CLEARVIEW CLEARIFY SYSTEM

## (undated) DEVICE — CATH IV SAFE BC 20G X 1.16" (PINK)

## (undated) DEVICE — SUCTION YANKAUER TAPERED BULBOUS NO VENT

## (undated) DEVICE — PROTECTOR HEEL / ELBOW

## (undated) DEVICE — SUT VLOC 180 3-0 9" V-20 GREEN

## (undated) DEVICE — FOLEY TRAY 16FR 5CC LF UMETER CLOSED

## (undated) DEVICE — TUBING AIRSEAL TRI-LUMEN FILTERED

## (undated) DEVICE — APPLICATOR VISTASEAL LAP DUAL 35CM RIGID

## (undated) DEVICE — XI SYNCHROSEAL VESSEL SEALER 8MM

## (undated) DEVICE — ENDOCATCH II 15MM

## (undated) DEVICE — UTERINE MANIPULATOR MPM MEDICAL ZUMI 4.5MM

## (undated) DEVICE — TUBING SUCTION CONN 6FT STERILE

## (undated) DEVICE — XI ENDOWRIST SUCTION IRRIGATOR 8MM

## (undated) DEVICE — SUT POLYSORB 0 60" TIES UNDYED

## (undated) DEVICE — POSITIONER PINK PAD PIGAZZI SYSTEM

## (undated) DEVICE — SNARE POLYP SENS 27MM 240CM

## (undated) DEVICE — CATH ELECHMSTAT  INJ 7FR 210CM

## (undated) DEVICE — POSITIONER PURPLE ARM ONE STEP (LARGE)

## (undated) DEVICE — SUT VLOC 180 0 9" GS-21 GREEN

## (undated) DEVICE — XI ARM SCISSOR MONO CURVED

## (undated) DEVICE — GOWN XL

## (undated) DEVICE — UTERINE MANIPULATOR CONMED VCARE MED 34MM

## (undated) DEVICE — MASK O2 ADLT POM ELITE W/ MED AND HI CONCEN M TO M 10FT

## (undated) DEVICE — CATH IV SAFE BC 22G X 1" (BLUE)

## (undated) DEVICE — XI TIP COVER

## (undated) DEVICE — SOL IRR NS 0.9% 250ML

## (undated) DEVICE — NDL INJ SCLERO INTERJECT 23G

## (undated) DEVICE — FORCEP RADIAL JAW 4 JUMBO 2.8MM 3.2MM 240CM ORANGE DISP

## (undated) DEVICE — XI SEAL UNIVERSIAL 5-12MM

## (undated) DEVICE — POSITIONER FOAM HEAD CRADLE (PINK)

## (undated) DEVICE — TUBING CANNULA SALTER LABS NASAL ADULT 7FT

## (undated) DEVICE — MARKER ENDO SPOT EX

## (undated) DEVICE — ENDOCUFF VISION SZ 3 SM PRPL